# Patient Record
Sex: MALE | Race: WHITE | Employment: OTHER | URBAN - METROPOLITAN AREA
[De-identification: names, ages, dates, MRNs, and addresses within clinical notes are randomized per-mention and may not be internally consistent; named-entity substitution may affect disease eponyms.]

---

## 2018-05-21 ENCOUNTER — OFFICE VISIT (OUTPATIENT)
Dept: CARDIOLOGY CLINIC | Age: 73
End: 2018-05-21
Payer: COMMERCIAL

## 2018-05-21 VITALS
HEIGHT: 71 IN | RESPIRATION RATE: 16 BRPM | BODY MASS INDEX: 31.92 KG/M2 | SYSTOLIC BLOOD PRESSURE: 120 MMHG | WEIGHT: 228 LBS | HEART RATE: 54 BPM | DIASTOLIC BLOOD PRESSURE: 68 MMHG

## 2018-05-21 DIAGNOSIS — I25.10 CORONARY ARTERY DISEASE WITHOUT ANGINA PECTORIS, UNSPECIFIED VESSEL OR LESION TYPE, UNSPECIFIED WHETHER NATIVE OR TRANSPLANTED HEART: Primary | ICD-10-CM

## 2018-05-21 DIAGNOSIS — I10 ESSENTIAL HYPERTENSION: ICD-10-CM

## 2018-05-21 DIAGNOSIS — Z95.1 STATUS POST CORONARY ARTERY BYPASS GRAFT: ICD-10-CM

## 2018-05-21 PROCEDURE — 99213 OFFICE O/P EST LOW 20 MIN: CPT | Performed by: INTERNAL MEDICINE

## 2018-05-21 PROCEDURE — 93000 ELECTROCARDIOGRAM COMPLETE: CPT | Performed by: INTERNAL MEDICINE

## 2018-05-21 RX ORDER — TAMSULOSIN HYDROCHLORIDE 0.4 MG/1
0.8 CAPSULE ORAL NIGHTLY
COMMUNITY

## 2019-03-08 ENCOUNTER — HOSPITAL ENCOUNTER (OUTPATIENT)
Age: 74
Discharge: HOME | End: 2019-03-10
Payer: COMMERCIAL

## 2019-03-08 LAB — PROSTATE SPECIFIC ANTIGEN: 1.12 NG/ML (ref 0–4)

## 2019-03-08 PROCEDURE — 84153 ASSAY OF PSA TOTAL: CPT

## 2019-03-26 ENCOUNTER — TELEPHONE (OUTPATIENT)
Dept: ADMINISTRATIVE | Age: 74
End: 2019-03-26

## 2019-10-02 ENCOUNTER — OFFICE VISIT (OUTPATIENT)
Dept: CARDIOLOGY CLINIC | Age: 74
End: 2019-10-02
Payer: COMMERCIAL

## 2019-10-02 VITALS
HEIGHT: 71 IN | SYSTOLIC BLOOD PRESSURE: 128 MMHG | HEART RATE: 50 BPM | WEIGHT: 231.2 LBS | DIASTOLIC BLOOD PRESSURE: 80 MMHG | BODY MASS INDEX: 32.37 KG/M2 | RESPIRATION RATE: 16 BRPM

## 2019-10-02 DIAGNOSIS — I25.10 CORONARY ARTERY DISEASE INVOLVING NATIVE CORONARY ARTERY OF NATIVE HEART WITHOUT ANGINA PECTORIS: Primary | ICD-10-CM

## 2019-10-02 DIAGNOSIS — Z99.89 OSA ON CPAP: ICD-10-CM

## 2019-10-02 DIAGNOSIS — G47.33 OSA ON CPAP: ICD-10-CM

## 2019-10-02 DIAGNOSIS — Z95.1 S/P CORONARY ARTERY BYPASS GRAFT X 1: ICD-10-CM

## 2019-10-02 DIAGNOSIS — I10 ESSENTIAL HYPERTENSION: ICD-10-CM

## 2019-10-02 DIAGNOSIS — R00.1 SINUS BRADYCARDIA: ICD-10-CM

## 2019-10-02 PROCEDURE — 99214 OFFICE O/P EST MOD 30 MIN: CPT | Performed by: INTERNAL MEDICINE

## 2019-10-02 PROCEDURE — 93000 ELECTROCARDIOGRAM COMPLETE: CPT | Performed by: INTERNAL MEDICINE

## 2019-10-02 RX ORDER — NAPROXEN SODIUM 220 MG
220 TABLET ORAL PRN
COMMUNITY

## 2019-10-02 SDOH — HEALTH STABILITY: MENTAL HEALTH: HOW MANY STANDARD DRINKS CONTAINING ALCOHOL DO YOU HAVE ON A TYPICAL DAY?: 1 OR 2

## 2019-10-02 SDOH — HEALTH STABILITY: MENTAL HEALTH: HOW OFTEN DO YOU HAVE A DRINK CONTAINING ALCOHOL?: MONTHLY OR LESS

## 2019-10-08 ENCOUNTER — NURSE ONLY (OUTPATIENT)
Dept: CARDIOLOGY CLINIC | Age: 74
End: 2019-10-08

## 2019-10-08 VITALS
BODY MASS INDEX: 32.17 KG/M2 | SYSTOLIC BLOOD PRESSURE: 136 MMHG | RESPIRATION RATE: 16 BRPM | HEART RATE: 60 BPM | DIASTOLIC BLOOD PRESSURE: 68 MMHG | HEIGHT: 71 IN | WEIGHT: 229.8 LBS

## 2019-10-18 ENCOUNTER — HOSPITAL ENCOUNTER (OUTPATIENT)
Age: 74
Discharge: HOME | End: 2019-10-20
Payer: COMMERCIAL

## 2019-10-18 PROCEDURE — 87088 URINE BACTERIA CULTURE: CPT

## 2019-11-08 ENCOUNTER — TELEPHONE (OUTPATIENT)
Dept: CARDIOLOGY CLINIC | Age: 74
End: 2019-11-08

## 2020-12-21 ENCOUNTER — OFFICE VISIT (OUTPATIENT)
Dept: CARDIOLOGY CLINIC | Age: 75
End: 2020-12-21
Payer: MEDICARE

## 2020-12-21 VITALS
DIASTOLIC BLOOD PRESSURE: 74 MMHG | HEART RATE: 56 BPM | SYSTOLIC BLOOD PRESSURE: 138 MMHG | RESPIRATION RATE: 16 BRPM | OXYGEN SATURATION: 97 % | HEIGHT: 71 IN | WEIGHT: 238 LBS | BODY MASS INDEX: 33.32 KG/M2

## 2020-12-21 PROCEDURE — 93000 ELECTROCARDIOGRAM COMPLETE: CPT | Performed by: INTERNAL MEDICINE

## 2020-12-21 PROCEDURE — 99214 OFFICE O/P EST MOD 30 MIN: CPT | Performed by: INTERNAL MEDICINE

## 2020-12-21 RX ORDER — OXYBUTYNIN CHLORIDE 10 MG/1
10 TABLET, EXTENDED RELEASE ORAL DAILY
COMMUNITY

## 2020-12-21 RX ORDER — MULTIVIT-MIN/IRON/FOLIC ACID/K 18-600-40
CAPSULE ORAL DAILY
COMMUNITY

## 2020-12-21 RX ORDER — TRAZODONE HYDROCHLORIDE 50 MG/1
25 TABLET ORAL NIGHTLY
COMMUNITY

## 2020-12-21 RX ORDER — MEMANTINE HYDROCHLORIDE 5 MG/1
5 TABLET ORAL DAILY
COMMUNITY

## 2020-12-21 NOTE — PROGRESS NOTES
OFFICE VISIT     PRIMARY CARE PHYSICIAN:      Joe Montoya DO       ALLERGIES / SENSITIVITIES:        Allergies   Allergen Reactions    Anafranil [Clomipramine Hcl]      Panic reaction    Atarax [Hydroxyzine] Other (See Comments)     Panic attack    Serzone [Nefazodone Hydrochloride] Other (See Comments)     Panic attack    Vistaril [Hydroxyzine Hcl] Other (See Comments)     Panic attack    Zoloft      ? REVIEWED MEDICATIONS:        Current Outpatient Medications:     oxybutynin (DITROPAN-XL) 10 MG extended release tablet, Take 10 mg by mouth daily, Disp: , Rfl:     traZODone (DESYREL) 50 MG tablet, Take 25 mg by mouth nightly, Disp: , Rfl:     memantine (NAMENDA) 5 MG tablet, Take 5 mg by mouth daily, Disp: , Rfl:     Ascorbic Acid (VITAMIN C) 500 MG CAPS, Take by mouth daily, Disp: , Rfl:     metoprolol tartrate (LOPRESSOR) 25 MG tablet, Take 0.5 tablets by mouth 2 times daily, Disp: 90 tablet, Rfl: 3    Calcium Carbonate-Vit D-Min (CALTRATE 600+D PLUS MINERALS PO), Take 1 tablet by mouth daily, Disp: , Rfl:     naproxen sodium (ALEVE) 220 MG tablet, Take 220 mg by mouth as needed for Pain, Disp: , Rfl:     tamsulosin (FLOMAX) 0.4 MG capsule, Take 0.8 mg by mouth nightly , Disp: , Rfl:     Omega-3 Fatty Acids (FISH OIL PO), Take 1 capsule by mouth daily , Disp: , Rfl:     donepezil (ARICEPT) 10 MG tablet, Take 10 mg by mouth nightly , Disp: , Rfl:     GLUCOSAMINE HCL PO, Take 2,000 mg by mouth 2 times daily, Disp: , Rfl:     NONFORMULARY, Digest-Zyme 1 daily, Disp: , Rfl:     atorvastatin (LIPITOR) 10 MG tablet, Take 10 mg by mouth daily , Disp: , Rfl:     Multiple Vitamins-Minerals (CENTRUM ADULTS PO), Take 1 tablet by mouth daily , Disp: , Rfl:     hydrochlorothiazide (HYDRODIURIL) 25 MG tablet, Take 25 mg by mouth daily. , Disp: , Rfl:     losartan (COZAAR) 100 MG tablet, Take 100 mg by mouth daily. , Disp: , Rfl:  Hypogonadism male     Psychiatric disorder     Renal disease     S/P sclerotherapy of varicose veins     both legs    Sleep apnea     in past    Thyroid disease     hypothyroidism            Past Surgical History:   Procedure Laterality Date    APPENDECTOMY      CARDIAC CATHETERIZATION  7/22/2013    [facility]    CARDIAC SURGERY      stents    CATARACT REMOVAL WITH IMPLANT Bilateral     left 12/16 & right 1/17    CHOLECYSTECTOMY      CIRCUMCISION      CORONARY ANGIOPLASTY  01/24/97    Stent LAD    CORONARY ANGIOPLASTY  03/14/11    instent restenosis of previously placed stents    CORONARY ARTERY BYPASS GRAFT  8/6/13    CYSTOURETHROSCOPY/URETHRAL DILATION      DIAGNOSTIC CARDIAC [facility]EDURE  [facility]    DIAGNOSTIC CARDIAC CATH LAB PROCEDURE  01/23/97    [facility]    DIAGNOSTIC CARDIAC CATH LAB PROCEDURE  11/25/97    [facility]    DIAGNOSTIC CARDIAC CATH LAB PROCEDURE  02/18/10    [facility], stent/proximal LAD    DIAGNOSTIC CARDIAC CATH LAB PROCEDURE  03/14/11    [facility] mid LAD           Family History   Problem Relation Age of Onset    Diabetes Sister     Hypertension Sister     Elevated Lipids Sister     Coronary Art Dis Sister         s/p CABG          Social History     Tobacco Use    Smoking status: Never Smoker    Smokeless tobacco: Never Used   Substance Use Topics    Alcohol use:  Yes     Alcohol/week: 0.0 standard drinks     Frequency: Monthly or less     Drinks per session: 1 or 2     Binge frequency: Never     Comment: occasional glass of wine         Review of Systems:  Constitutional: negative for fever and chills, or significant weight loss  HEENT: negative for acute visual symptoms or auditory problems, no dysphagia  Respiratory: negative for cough, wheezing, or hemoptysis  Cardiovascular: negative for chest pain, palpitations, and dyspnea  Gastrointestinal: negative for abdominal pain, diarrhea, nausea and vomiting  Endocrine: Negative for polyuria and polydyspsia S/P coronary artery bypass graft - off-pump CABG  x1, left internal mammary artery to the LAD on 8/6/2013. Dr Carolin Lennox     Non morbid obesity - Diet, exercise and weight loss discussed. JEFRY on CPAP - Don't use CPAP any more     Edema feet - Elevate feet, decrease salt, He was referred to Vascular surgery - Dr Mark Shanks, [place]    Preventive Cardiology: Low cholesterol diet, regular exercise as tolerate, and gradual weight loss discussed. Monitor BP and heart rates. Above recommendations discussed with him   All questions answered about cardiac diagnoses and cardiac medications. Continue current medications. Compliance with medications and f/u with all physicians discussed. Risk factor modification based on risk profile discussed. Call if any exertional chest pain, short of breath, dizzy or palpitations   Follow up in 6 months or earlier if needed.          [facility]  [address] [facility], [address] [place] [address]  [phone]

## 2021-02-26 ENCOUNTER — IMMUNIZATION (OUTPATIENT)
Dept: PRIMARY CARE CLINIC | Age: 76
End: 2021-02-26
Payer: MEDICARE

## 2021-02-26 PROCEDURE — 91300 COVID-19, PFIZER VACCINE 30MCG/0.3ML DOSE: CPT | Performed by: PHYSICIAN ASSISTANT

## 2021-02-26 PROCEDURE — 0001A COVID-19, PFIZER VACCINE 30MCG/0.3ML DOSE: CPT | Performed by: PHYSICIAN ASSISTANT

## 2021-03-22 ENCOUNTER — IMMUNIZATION (OUTPATIENT)
Dept: PRIMARY CARE CLINIC | Age: 76
End: 2021-03-22
Payer: MEDICARE

## 2021-03-22 PROCEDURE — 0002A COVID-19, PFIZER VACCINE 30MCG/0.3ML DOSE: CPT | Performed by: NURSE PRACTITIONER

## 2021-03-22 PROCEDURE — 91300 COVID-19, PFIZER VACCINE 30MCG/0.3ML DOSE: CPT | Performed by: NURSE PRACTITIONER

## 2022-06-25 ENCOUNTER — HOSPITAL ENCOUNTER (EMERGENCY)
Age: 77
Discharge: HOME | End: 2022-06-25
Attending: EMERGENCY MEDICINE
Payer: MEDICARE

## 2022-06-25 ENCOUNTER — APPOINTMENT (OUTPATIENT)
Dept: CT IMAGING | Age: 77
End: 2022-06-25
Payer: MEDICARE

## 2022-06-25 VITALS
BODY MASS INDEX: 30.1 KG/M2 | WEIGHT: 215 LBS | TEMPERATURE: 98 F | HEART RATE: 60 BPM | HEIGHT: 71 IN | RESPIRATION RATE: 18 BRPM | SYSTOLIC BLOOD PRESSURE: 180 MMHG | DIASTOLIC BLOOD PRESSURE: 76 MMHG | OXYGEN SATURATION: 98 %

## 2022-06-25 DIAGNOSIS — I10 ESSENTIAL HYPERTENSION: Primary | ICD-10-CM

## 2022-06-25 LAB
ANION GAP SERPL CALCULATED.3IONS-SCNC: 8 MMOL/L (ref 7–16)
BUN BLDV-MCNC: 23 MG/DL (ref 6–23)
CALCIUM SERPL-MCNC: 9.4 MG/DL (ref 8.6–10.2)
CHLORIDE BLD-SCNC: 105 MMOL/L (ref 98–107)
CO2: 29 MMOL/L (ref 22–29)
CREAT SERPL-MCNC: 1 MG/DL (ref 0.7–1.2)
GFR AFRICAN AMERICAN: >60
GFR NON-AFRICAN AMERICAN: >60 ML/MIN/1.73
GLUCOSE BLD-MCNC: 113 MG/DL (ref 74–99)
HCT VFR BLD CALC: 41.7 % (ref 37–54)
HEMOGLOBIN: 14.1 G/DL (ref 12.5–16.5)
MCH RBC QN AUTO: 31.1 PG (ref 26–35)
MCHC RBC AUTO-ENTMCNC: 33.8 % (ref 32–34.5)
MCV RBC AUTO: 91.9 FL (ref 80–99.9)
PDW BLD-RTO: 12.6 FL (ref 11.5–15)
PLATELET # BLD: 224 E9/L (ref 130–450)
PMV BLD AUTO: 10.5 FL (ref 7–12)
POTASSIUM SERPL-SCNC: 4.2 MMOL/L (ref 3.5–5)
RBC # BLD: 4.54 E12/L (ref 3.8–5.8)
SODIUM BLD-SCNC: 142 MMOL/L (ref 132–146)
TROPONIN, HIGH SENSITIVITY: 16 NG/L (ref 0–11)
TROPONIN, HIGH SENSITIVITY: 19 NG/L (ref 0–11)
WBC # BLD: 10.3 E9/L (ref 4.5–11.5)

## 2022-06-25 PROCEDURE — 36415 COLL VENOUS BLD VENIPUNCTURE: CPT

## 2022-06-25 PROCEDURE — 93005 ELECTROCARDIOGRAM TRACING: CPT | Performed by: PHYSICIAN ASSISTANT

## 2022-06-25 PROCEDURE — 96372 THER/PROPH/DIAG INJ SC/IM: CPT

## 2022-06-25 PROCEDURE — 6360000002 HC RX W HCPCS: Performed by: STUDENT IN AN ORGANIZED HEALTH CARE EDUCATION/TRAINING PROGRAM

## 2022-06-25 PROCEDURE — 85027 COMPLETE CBC AUTOMATED: CPT

## 2022-06-25 PROCEDURE — 96374 THER/PROPH/DIAG INJ IV PUSH: CPT

## 2022-06-25 PROCEDURE — 99284 EMERGENCY DEPT VISIT MOD MDM: CPT

## 2022-06-25 PROCEDURE — 80048 BASIC METABOLIC PNL TOTAL CA: CPT

## 2022-06-25 PROCEDURE — 84484 ASSAY OF TROPONIN QUANT: CPT

## 2022-06-25 PROCEDURE — 70450 CT HEAD/BRAIN W/O DYE: CPT

## 2022-06-25 RX ORDER — HYDRALAZINE HYDROCHLORIDE 20 MG/ML
10 INJECTION INTRAMUSCULAR; INTRAVENOUS ONCE
Status: COMPLETED | OUTPATIENT
Start: 2022-06-25 | End: 2022-06-25

## 2022-06-25 RX ORDER — HYDRALAZINE HYDROCHLORIDE 20 MG/ML
10 INJECTION INTRAMUSCULAR; INTRAVENOUS ONCE
Status: DISCONTINUED | OUTPATIENT
Start: 2022-06-25 | End: 2022-06-25

## 2022-06-25 RX ORDER — SODIUM CHLORIDE 0.9 % (FLUSH) 0.9 %
10 SYRINGE (ML) INJECTION PRN
Status: DISCONTINUED | OUTPATIENT
Start: 2022-06-25 | End: 2022-06-26 | Stop reason: HOSPADM

## 2022-06-25 RX ORDER — HYDRALAZINE HYDROCHLORIDE 20 MG/ML
5 INJECTION INTRAMUSCULAR; INTRAVENOUS ONCE
Status: COMPLETED | OUTPATIENT
Start: 2022-06-25 | End: 2022-06-25

## 2022-06-25 RX ADMIN — HYDRALAZINE HYDROCHLORIDE 10 MG: 20 INJECTION INTRAMUSCULAR; INTRAVENOUS at 21:25

## 2022-06-25 RX ADMIN — HYDRALAZINE HYDROCHLORIDE 5 MG: 20 INJECTION INTRAMUSCULAR; INTRAVENOUS at 20:05

## 2022-06-25 ASSESSMENT — PAIN SCALES - GENERAL: PAINLEVEL_OUTOF10: 5

## 2022-06-25 ASSESSMENT — PAIN DESCRIPTION - DESCRIPTORS: DESCRIPTORS: ACHING

## 2022-06-25 ASSESSMENT — PAIN DESCRIPTION - LOCATION: LOCATION: HEAD

## 2022-06-25 ASSESSMENT — PAIN - FUNCTIONAL ASSESSMENT: PAIN_FUNCTIONAL_ASSESSMENT: 0-10

## 2022-06-25 NOTE — ED PROVIDER NOTES
[address]      Pt Name: Andrew Ley  MRN: [id number]  [place] 1945  Date of evaluation: 6/25/2022      CHIEF COMPLAINT       Chief Complaint   Patient presents with    Hypertension     High blood pressures today at home. Compliant with BP meds. +intermittent dizziness. +headache. Hx of fall with head injury one month ago. HPI  Andrew Ley is a 65 y.o. male history of hypertension and presents with complaints of elevated blood pressure. Patient states that he also has intermittent headaches at times. Thinks is related to his blood pressure. No alleviating or exacerbating factors. He states that he takes medications as directed. Describes headache as dull, aching, located at the front of his scalp. No alleviating exacerbating factors. Has taken Tylenol with mild relief of his symptoms. Patient does add that when he wakes up in the morning he always feels lightheaded and has near syncopal episodes when he stands up. States that he has not fallen from episodes like this and he usually sits down or lies flat and the symptoms lc. Denies any photophobia, focal logic deficits, changes in vision, ataxia, fevers, chills, nausea, vomiting, chest pain, shortness of breath, abdominal pain, flank pain, dysuria, hematuria, diarrhea, constipation, new rashes or sores. Except as noted above the remainder of the review of systems was reviewed and negative. Review of Systems   Constitutional: Negative for activity change, appetite change, diaphoresis, fatigue, fever and unexpected weight change. HENT: Negative for congestion. Eyes: Negative for visual disturbance. Respiratory: Negative for cough, shortness of breath, wheezing and stridor. Cardiovascular: Negative for chest pain, palpitations and leg swelling.    Gastrointestinal: Negative for abdominal distention, abdominal pain, constipation, diarrhea, nausea and vomiting. Endocrine: Negative for polyphagia and polyuria. Genitourinary: Negative for dysuria and hematuria. Musculoskeletal: Negative for back pain. Skin: Negative for color change, pallor, rash and wound. Neurological: Positive for headaches. Negative for dizziness, seizures, syncope, speech difficulty and weakness. Hematological: Negative for adenopathy. Does not bruise/bleed easily. Psychiatric/Behavioral: Negative for agitation. Physical Exam  Vitals and nursing note reviewed. Constitutional:       General: He is not in acute distress. Appearance: Normal appearance. He is not ill-appearing or diaphoretic. HENT:      Head: Normocephalic and atraumatic. Right Ear: External ear normal.      Left Ear: External ear normal.      Nose: Nose normal.      Mouth/Throat:      Mouth: Mucous membranes are moist.      Pharynx: Oropharynx is clear. Eyes:      Extraocular Movements: Extraocular movements intact. Pupils: Pupils are equal, round, and reactive to light. Cardiovascular:      Rate and Rhythm: Normal rate and regular rhythm. Pulses: Normal pulses. Heart sounds: Normal heart sounds. Pulmonary:      Effort: Pulmonary effort is normal.      Breath sounds: Normal breath sounds. Abdominal:      General: Abdomen is flat. Palpations: Abdomen is soft. Tenderness: There is no abdominal tenderness. There is no right CVA tenderness, left CVA tenderness or rebound. Negative signs include Dias's sign, Rovsing's sign and McBurney's sign. Musculoskeletal:         General: Normal range of motion. Cervical back: Normal range of motion. Right lower leg: No edema. Left lower leg: No edema. Skin:     General: Skin is warm and dry. Capillary Refill: Capillary refill takes less than 2 seconds. Neurological:      General: No focal deficit present.       Mental Status: He is alert and oriented to person, place, and time. Psychiatric:         Mood and Affect: Mood normal.          Procedures     MDM  Number of Diagnoses or Management Options  Essential hypertension  Diagnosis management comments: 25-year-old male history of hypertension presents with complaints of elevated blood pressure and intermittent headaches. Vitals here significant for hypertension. On physical exam patient is no acute distress. Cranial nerves II through XII grossly intact. Symmetric discharge and discharge kinesia. Lungs are clear to auscultation bilaterally no wheeze rales rhonchi. Normal S1 and S2. Abdomen soft and nontender with no rebound or guarding. Diagnostic labs and imaging interpreted reviewed. CT head negative for any acute process. No signs of endorgan damage. EKG shows no signs of ischemia. Patient was given hydralazine here with relief of his blood pressure. He states that he did not have a headache at this time. Patient discharged home with close follow-up with his primary care physician for blood pressure med. I did not want to give the patient any more blood pressure medication as an outpatient as he has had episodes of near syncopal episodes from history. Patient is agreeable with plan. Patient is awake alert, hemodynamic stable, afebrile and in no respiratory distress. Discussed with patient plan for close outpatient follow-up with the patient's PCP as well as return precautions and the patient understands and agrees to the plan.     Patient was seen with attending.                   --------------------------------------------- PAST HISTORY ---------------------------------------------  Past Medical History:  has a past medical history of Anxiety and depression, CAD (coronary artery disease), Depression, DM (diabetes mellitus) ([facility] [address] Erectile dysfunction, Essential hypertension, Gastric ulcer, H/O scarlet fever, Heart murmur, Hyperlipidemia, Hypertension, Hypogonadism male, Psychiatric disorder, Renal disease, S/P sclerotherapy of varicose veins, Sleep apnea, and Thyroid disease. Past Surgical History:  has a past surgical history that includes Cholecystectomy; Appendectomy; Cardiac surgery; Diagnostic Cardiac Cath Lab Procedure (1987); Diagnostic Cardiac Cath Lab Procedure (01/23/97); Diagnostic Cardiac Cath Lab Procedure (11/25/97); Diagnostic Cardiac Cath Lab Procedure (02/18/10); Diagnostic Cardiac Cath Lab Procedure (03/14/11); Coronary angioplasty (01/24/97); Coronary angioplasty (03/14/11); Cystourethroscopy/Urethral Dilation; Circumcision; Cardiac catheterization (7/22/2013); Coronary artery bypass graft (8/6/13); and Cataract removal with implant (Bilateral). Social History:  reports that he has never smoked. He has never used smokeless tobacco. He reports current alcohol use. He reports that he does not use drugs. Family History: family history includes Coronary Art Dis in his sister; Diabetes in his sister; Elevated Lipids in his sister; Hypertension in his sister. The patients home medications have been reviewed.     Allergies: Anafranil [clomipramine hcl], Atarax [hydroxyzine], Serzone [nefazodone hydrochloride], Vistaril [hydroxyzine hcl], and Zoloft    -------------------------------------------------- RESULTS -------------------------------------------------  Labs:  Results for orders placed or performed during the hospital encounter of 27/37/88   Basic metabolic panel   Result Value Ref Range    Sodium 142 132 - 146 mmol/L    Potassium 4.2 3.5 - 5.0 mmol/L    Chloride 105 98 - 107 mmol/L    CO2 29 22 - 29 mmol/L    Anion Gap 8 7 - 16 mmol/L    Glucose 113 (H) 74 - 99 mg/dL    BUN 23 6 - 23 mg/dL    CREATININE 1.0 0.7 - 1.2 mg/dL    GFR Non-African American >60 >=60 mL/min/1.73    GFR African American >60     Calcium 9.4 8.6 - 10.2 mg/dL   CBC   Result Value Ref Range    WBC 10.3 4.5 - 11.5 E9/L    RBC 4.54 3.80 - 5.80 E12/L    Hemoglobin 14.1 12.5 - 16.5 g/dL    Hematocrit 41.7 37.0 - 54.0 %    MCV 91.9 80.0 - 99.9 fL    MCH 31.1 26.0 - 35.0 pg    MCHC 33.8 32.0 - 34.5 %    RDW 12.6 11.5 - 15.0 fL    Platelets 123 708 - 916 E9/L    MPV 10.5 7.0 - 12.0 fL   Troponin   Result Value Ref Range    Troponin, High Sensitivity 16 (H) 0 - 11 ng/L   Troponin   Result Value Ref Range    Troponin, High Sensitivity 19 (H) 0 - 11 ng/L   EKG 12 Lead   Result Value Ref Range    Ventricular Rate 52 BPM    Atrial Rate 52 BPM    P-R Interval 220 ms    QRS Duration 96 ms    Q-T Interval 482 ms    QTc Calculation (Bazett) 448 ms    P Axis 41 degrees    R Axis 62 degrees    T Axis -148 degrees       ECG:  Please refer to workup tab for EKG read    Radiology:  CT HEAD WO CONTRAST   Final Result   1. No intracranial hemorrhage or acute intracranial disease. 2.  Mild cerebellar atrophy. 3.  Mild paranasal sinusitis. EKG: This EKG is signed and interpreted by me. Sinus bradycardia first-degree AV block, no ST segment elevation or depression, UT interval 220 MS, QRS 96 MS,  MS  Comparison: no previous EKG      ------------------------- NURSING NOTES AND VITALS REVIEWED ---------------------------  Date / Time Roomed:  6/25/2022  6:40 PM  ED Bed Assignment:  33/33    The nursing notes within the ED encounter and vital signs as below have been reviewed. BP (!) 180/76   Pulse 60   Temp 98 °F (36.7 °C) (Oral)   Resp 18   Ht 5' 11\" (1.803 m)   Wt 215 lb (97.5 kg)   SpO2 98%   BMI 29.99 kg/m²   Oxygen Saturation Interpretation: normal      ------------------------------------------ PROGRESS NOTES ------------------------------------------    I have spoken with the patient and discussed todays results, in addition to providing specific details for the plan of care and counseling regarding the diagnosis and prognosis. Their questions are answered at this time and they are agreeable with the plan. I discussed at length with them reasons for immediate return here for re evaluation.  They will followup with their PCP by calling their office tomorrow. --------------------------------- ADDITIONAL PROVIDER NOTES ---------------------------------  At this time the patient is without objective evidence of an acute process requiring hospitalization or inpatient management. They have remained hemodynamically stable throughout their entire ED visit and are stable for discharge with outpatient follow-up. The plan has been discussed in detail and they are aware of the specific conditions for emergent return, as well as the importance of follow-up. Discharge Medication List as of 2022  8:58 PM          Diagnosis:  1. Essential hypertension        Disposition:  Patient's disposition: Discharge to home  Patient's condition is stable. Guanaco Morrison MD  Resident  22 [phone]     ATTENDING PROVIDER ATTESTATION:     I,  Dr. Tony Luu am the primary physician of record for this patient. Naheed Cordero presented to the emergency department for evaluation of Hypertension (High blood pressures today at home. Compliant with BP meds. +intermittent dizziness. +headache. Hx of fall with head injury one month ago. )   and was initially evaluated by the Medical Resident. See Original ED Note for H&P and ED course above. I have reviewed and discussed the case, including pertinent history (medical, surgical, family and social) and exam findings with the Medical Resident assigned to Naheed Cordero. I have personally performed and/or participated in the history, exam, medical decision making, and procedures and agree with all pertinent clinical information. I have reviewed my findings and recommendations with the assigned Medical Resident, Naheed Cordero and members of family present at the time of disposition. My findings/plan: The encounter diagnosis was Essential hypertension.   Discharge Medication List as of 2022  8:58 PM        DO Jessica Azul [address] [facility],   06/26/22 0252

## 2022-06-26 ASSESSMENT — ENCOUNTER SYMPTOMS
ABDOMINAL PAIN: 0
COLOR CHANGE: 0
ABDOMINAL DISTENTION: 0
COUGH: 0
DIARRHEA: 0
CONSTIPATION: 0
SHORTNESS OF BREATH: 0
STRIDOR: 0
BACK PAIN: 0
WHEEZING: 0
VOMITING: 0
NAUSEA: 0

## 2022-06-27 LAB
EKG ATRIAL RATE: 52 BPM
EKG P AXIS: 41 DEGREES
EKG P-R INTERVAL: 220 MS
EKG Q-T INTERVAL: 482 MS
EKG QRS DURATION: 96 MS
EKG QTC CALCULATION (BAZETT): 448 MS
EKG R AXIS: 62 DEGREES
EKG T AXIS: -148 DEGREES
EKG VENTRICULAR RATE: 52 BPM

## 2022-07-13 ENCOUNTER — OFFICE VISIT (OUTPATIENT)
Dept: CARDIOLOGY CLINIC | Age: 77
End: 2022-07-13
Payer: MEDICARE

## 2022-07-13 VITALS
SYSTOLIC BLOOD PRESSURE: 140 MMHG | HEIGHT: 71 IN | OXYGEN SATURATION: 97 % | RESPIRATION RATE: 16 BRPM | BODY MASS INDEX: 31.27 KG/M2 | WEIGHT: 223.4 LBS | DIASTOLIC BLOOD PRESSURE: 80 MMHG | HEART RATE: 56 BPM

## 2022-07-13 DIAGNOSIS — I25.10 CORONARY ARTERY DISEASE INVOLVING NATIVE CORONARY ARTERY OF NATIVE HEART WITHOUT ANGINA PECTORIS: ICD-10-CM

## 2022-07-13 DIAGNOSIS — Z95.1 STATUS POST CORONARY ARTERY BYPASS GRAFT: ICD-10-CM

## 2022-07-13 DIAGNOSIS — I10 ESSENTIAL HYPERTENSION: Primary | ICD-10-CM

## 2022-07-13 DIAGNOSIS — R00.1 SINUS BRADYCARDIA: ICD-10-CM

## 2022-07-13 DIAGNOSIS — E66.9 NON MORBID OBESITY: ICD-10-CM

## 2022-07-13 DIAGNOSIS — Z99.89 OSA ON CPAP: ICD-10-CM

## 2022-07-13 DIAGNOSIS — G47.33 OSA ON CPAP: ICD-10-CM

## 2022-07-13 PROCEDURE — 1123F ACP DISCUSS/DSCN MKR DOCD: CPT | Performed by: INTERNAL MEDICINE

## 2022-07-13 PROCEDURE — 99214 OFFICE O/P EST MOD 30 MIN: CPT | Performed by: INTERNAL MEDICINE

## 2022-07-13 PROCEDURE — 93000 ELECTROCARDIOGRAM COMPLETE: CPT | Performed by: INTERNAL MEDICINE

## 2022-07-13 RX ORDER — AMLODIPINE BESYLATE 5 MG/1
5 TABLET ORAL DAILY
Qty: 30 TABLET | Refills: 3
Start: 2022-07-13 | End: 2022-07-18 | Stop reason: SDUPTHER

## 2022-07-13 RX ORDER — CHOLECALCIFEROL (VITAMIN D3) 125 MCG
5 CAPSULE ORAL NIGHTLY PRN
COMMUNITY

## 2022-07-13 RX ORDER — OMEPRAZOLE 20 MG/1
20 CAPSULE, DELAYED RELEASE ORAL DAILY
COMMUNITY

## 2022-07-13 NOTE — PATIENT INSTRUCTIONS
Monitor BP  Start new medicine - Amlodipine, call if edema feet  Stop Metoprolol due to slow heart rates

## 2022-07-13 NOTE — PROGRESS NOTES
OFFICE VISIT     PRIMARY CARE PHYSICIAN:      Gus Hassan, DO       ALLERGIES / SENSITIVITIES:        Allergies   Allergen Reactions    Anafranil [Clomipramine Hcl]      Panic reaction    Atarax [Hydroxyzine] Other (See Comments)     Panic attack    Serzone [Nefazodone Hydrochloride] Other (See Comments)     Panic attack    Vistaril [Hydroxyzine Hcl] Other (See Comments)     Panic attack    Zoloft      ? REVIEWED MEDICATIONS:        Current Outpatient Medications:     melatonin 5 MG TABS tablet, Take 5 mg by mouth nightly as needed, Disp: , Rfl:     omeprazole (PRILOSEC) 20 MG delayed release capsule, Take 20 mg by mouth daily, Disp: , Rfl:     amLODIPine (NORVASC) 5 MG tablet, Take 1 tablet by mouth daily, Disp: 30 tablet, Rfl: 3    oxybutynin (DITROPAN-XL) 10 MG extended release tablet, Take 10 mg by mouth daily, Disp: , Rfl:     Ascorbic Acid (VITAMIN C) 500 MG CAPS, Take by mouth daily, Disp: , Rfl:     Calcium Carbonate-Vit D-Min (CALTRATE 600+D PLUS MINERALS PO), Take 1 tablet by mouth daily, Disp: , Rfl:     naproxen sodium (ALEVE) 220 MG tablet, Take 220 mg by mouth as needed for Pain, Disp: , Rfl:     tamsulosin (FLOMAX) 0.4 MG capsule, Take 0.8 mg by mouth nightly , Disp: , Rfl:     Omega-3 Fatty Acids (FISH OIL PO), Take 1 capsule by mouth daily , Disp: , Rfl:     donepezil (ARICEPT) 10 MG tablet, Take 10 mg by mouth nightly , Disp: , Rfl:     GLUCOSAMINE HCL PO, Take 2,000 mg by mouth 2 times daily, Disp: , Rfl:     atorvastatin (LIPITOR) 10 MG tablet, Take 10 mg by mouth daily , Disp: , Rfl:     Multiple Vitamins-Minerals (CENTRUM ADULTS PO), Take 1 tablet by mouth daily , Disp: , Rfl:     hydrochlorothiazide (HYDRODIURIL) 25 MG tablet, Take 25 mg by mouth daily. , Disp: , Rfl:     losartan (COZAAR) 100 MG tablet, Take 100 mg by mouth daily. , Disp: , Rfl:     Docusate Sodium (DSS) 100 MG CAPS, Take 100 mg by mouth 2 times daily as needed. , Disp: 20 capsule, Rfl: 0    potassium chloride (MICRO-K) 10 MEQ CR capsule, Take 10 mEq by mouth 3 times daily , Disp: , Rfl:     aspirin EC 81 MG EC tablet, Take 81 mg by mouth daily. , Disp: , Rfl:     buPROPion (WELLBUTRIN XL) 150 MG XL tablet, Take 150 mg by mouth every morning.  , Disp: , Rfl:     citalopram (CELEXA) 40 MG tablet, Take 40 mg by mouth nightly., Disp: , Rfl:     Levothyroxine Sodium (SYNTHROID PO), Take 88 mcg by mouth daily , Disp: , Rfl:     ALPRAZolam (XANAX) 0.5 MG tablet, Take 0.25 mg by mouth daily. , Disp: , Rfl:     traZODone (DESYREL) 50 MG tablet, Take 25 mg by mouth nightly (Patient not taking: Reported on 7/13/2022), Disp: , Rfl:     memantine (NAMENDA) 5 MG tablet, Take 5 mg by mouth daily (Patient not taking: Reported on 7/13/2022), Disp: , Rfl:     NONFORMULARY, Digest-Zyme 1 daily (Patient not taking: Reported on 7/13/2022), Disp: , Rfl:       S: REASON FOR VISIT:       Chief Complaint   Patient presents with    Coronary Artery Disease     Overdue Annual- Patient complains of high blood pressure, dizziness, and lightheadeness,    Hypertension          History of Present Illness:       Office Visit for follow up of CAD, CABG, bradycardia   65 yr old Mark Trivedi with history of CAD, s/p CABG in 2013, sinus bradycardia came for f/u visit   C/o high BP and occ Dizzy - no syncope   Fell and seen in ER, but no syncope   Home BP readings were high   Told will be referred to Nephrology, for HTN   No hospitalizations or surgeries since last visit   Patient is compliant with all medications   Luis any exertional chest pain or short of breath   No palpitations, dizzy or syncope.    Active at home   Try to watch diet          Past Medical History:   Diagnosis Date    Anxiety and depression     CAD (coronary artery disease)     PTCA and stents    Depression     DM (diabetes mellitus) ([facility] [address]     Erectile dysfunction     Essential hypertension 5/23/2016    Gastric ulcer     multiple    H/O scarlet fever     Heart murmur     in childhood, diet, exercise, weight loss discussed     S/P coronary artery bypass graft - off-pump CABG  x1, left internal mammary artery to the LAD on 8/6/2013. Dr Idalia Klinefelter     Non morbid obesity - Diet, exercise and weight loss discussed. Headaches - Advised to see Neurology    Hx of Edema feet - Elevate feet, decrease salt, He was referred to Vascular surgery - Dr Cameron Baez, [place]     Preventive Cardiology: Low cholesterol diet, regular exercise as tolerate, and gradual weight loss discussed. Other orders  -     amLODIPine (NORVASC) 5 MG tablet; Take 1 tablet by mouth daily     Above recommendations discussed him and his wife. The patient's current medication list, allergies, problem list and results of prior tests (as available) were reviewed at today's visit   All questions answered about cardiac diagnoses and cardiac medications. Continue current medications. Monitor BP and heart rates. Compliance with medications and f/u with all physicians discussed. Risk factor modification based on risk profile discussed. Call if any exertional chest pain, short of breath, dizzy or palpitations. Follow up in 9 months or earlier if needed.          [facility]  [address], [place], [address] [place] [address]  [phone]

## 2022-07-18 RX ORDER — AMLODIPINE BESYLATE 5 MG/1
5 TABLET ORAL DAILY
Qty: 90 TABLET | Refills: 3 | Status: SHIPPED
Start: 2022-07-18 | End: 2022-08-03 | Stop reason: SDUPTHER

## 2022-07-27 ENCOUNTER — TELEPHONE (OUTPATIENT)
Dept: CARDIOLOGY CLINIC | Age: 77
End: 2022-07-27

## 2022-07-27 NOTE — TELEPHONE ENCOUNTER
Patient called. His BP has been running in the 200's. His PCP increased the Norvasc to 2 daily but it does not seem to be helping. BP was 213/122 with a heart rate of 67. Advised by PCP to go to ER at [facility] (is supposed to have renal artery us tomorrow). Advised to go to ER and I would let you know what was happening with him.

## 2022-08-03 RX ORDER — AMLODIPINE BESYLATE 10 MG/1
10 TABLET ORAL DAILY
Qty: 90 TABLET | Refills: 3 | Status: SHIPPED | OUTPATIENT
Start: 2022-08-03

## 2022-08-09 ENCOUNTER — TELEPHONE (OUTPATIENT)
Dept: CARDIOLOGY CLINIC | Age: 77
End: 2022-08-09

## 2023-02-09 ENCOUNTER — OFFICE VISIT (OUTPATIENT)
Dept: CARDIOLOGY CLINIC | Age: 78
End: 2023-02-09

## 2023-02-09 VITALS
WEIGHT: 229.2 LBS | SYSTOLIC BLOOD PRESSURE: 120 MMHG | RESPIRATION RATE: 18 BRPM | BODY MASS INDEX: 32.09 KG/M2 | DIASTOLIC BLOOD PRESSURE: 62 MMHG | HEART RATE: 55 BPM | HEIGHT: 71 IN

## 2023-02-09 DIAGNOSIS — I25.10 CORONARY ARTERY DISEASE INVOLVING NATIVE CORONARY ARTERY OF NATIVE HEART WITHOUT ANGINA PECTORIS: ICD-10-CM

## 2023-02-09 DIAGNOSIS — Z95.1 STATUS POST CORONARY ARTERY BYPASS GRAFT: ICD-10-CM

## 2023-02-09 DIAGNOSIS — R00.1 SINUS BRADYCARDIA: ICD-10-CM

## 2023-02-09 DIAGNOSIS — Z99.89 OSA ON CPAP: ICD-10-CM

## 2023-02-09 DIAGNOSIS — R42 DIZZY: ICD-10-CM

## 2023-02-09 DIAGNOSIS — I10 ESSENTIAL HYPERTENSION: Primary | ICD-10-CM

## 2023-02-09 DIAGNOSIS — G47.33 OSA ON CPAP: ICD-10-CM

## 2023-02-09 RX ORDER — ALFUZOSIN HYDROCHLORIDE 10 MG/1
TABLET, EXTENDED RELEASE ORAL
COMMUNITY
Start: 2023-01-25

## 2023-02-09 NOTE — PROGRESS NOTES
Patient was seen today and a 7 DAY XT monitor was placed. Monitor was ordered by Dr. Stew Pate. The monitor was applied, instructions were given to the patient. Patient stated understanding and gave verbalize readback.    Monitor company: [employer]  Serial number: [id number]

## 2023-02-09 NOTE — PROGRESS NOTES
OFFICE VISIT     PRIMARY CARE PHYSICIAN:      Donya Mederos,        ALLERGIES / SENSITIVITIES:        Allergies   Allergen Reactions    Anafranil [Clomipramine Hcl]      Panic reaction    Atarax [Hydroxyzine] Other (See Comments)     Panic attack    Serzone [Nefazodone Hydrochloride] Other (See Comments)     Panic attack    Vistaril [Hydroxyzine Hcl] Other (See Comments)     Panic attack    Zoloft      ? REVIEWED MEDICATIONS:        Current Outpatient Medications:     alfuzosin (UROXATRAL) 10 MG extended release tablet, TAKE ONE TABLET BY MOUTH EVERY DAY, Disp: , Rfl:     amLODIPine (NORVASC) 10 MG tablet, Take 1 tablet by mouth in the morning., Disp: 90 tablet, Rfl: 3    melatonin 5 MG TABS tablet, Take 5 mg by mouth nightly as needed, Disp: , Rfl:     omeprazole (PRILOSEC) 20 MG delayed release capsule, Take 20 mg by mouth daily, Disp: , Rfl:     oxybutynin (DITROPAN-XL) 10 MG extended release tablet, Take 10 mg by mouth daily, Disp: , Rfl:     Ascorbic Acid (VITAMIN C) 500 MG CAPS, Take by mouth daily, Disp: , Rfl:     Calcium Carbonate-Vit D-Min (CALTRATE 600+D PLUS MINERALS PO), Take 1 tablet by mouth daily, Disp: , Rfl:     Omega-3 Fatty Acids (FISH OIL PO), Take 1 capsule by mouth daily , Disp: , Rfl:     donepezil (ARICEPT) 10 MG tablet, Take 10 mg by mouth nightly , Disp: , Rfl:     GLUCOSAMINE HCL PO, Take 2,000 mg by mouth 2 times daily, Disp: , Rfl:     atorvastatin (LIPITOR) 10 MG tablet, Take 10 mg by mouth daily , Disp: , Rfl:     Multiple Vitamins-Minerals (CENTRUM ADULTS PO), Take 1 tablet by mouth daily , Disp: , Rfl:     hydrochlorothiazide (HYDRODIURIL) 25 MG tablet, Take 25 mg by mouth daily. , Disp: , Rfl:     losartan (COZAAR) 100 MG tablet, Take 100 mg by mouth daily. , Disp: , Rfl:     Docusate Sodium (DSS) 100 MG CAPS, Take 100 mg by mouth 2 times daily as needed. , Disp: 20 capsule, Rfl: 0    potassium chloride (MICRO-K) 10 MEQ CR capsule, Take 10 mEq by mouth 3 times daily , Disp: , Rfl:     aspirin EC 81 MG EC tablet, Take 81 mg by mouth daily. , Disp: , Rfl:     buPROPion (WELLBUTRIN XL) 150 MG XL tablet, Take 150 mg by mouth every morning.  , Disp: , Rfl:     citalopram (CELEXA) 40 MG tablet, Take 40 mg by mouth nightly., Disp: , Rfl:     Levothyroxine Sodium (SYNTHROID PO), Take 88 mcg by mouth daily , Disp: , Rfl:     ALPRAZolam (XANAX) 0.5 MG tablet, Take 0.25 mg by mouth daily. , Disp: , Rfl:     traZODone (DESYREL) 50 MG tablet, Take 25 mg by mouth nightly (Patient not taking: No sig reported), Disp: , Rfl:     memantine (NAMENDA) 5 MG tablet, Take 5 mg by mouth daily (Patient not taking: No sig reported), Disp: , Rfl:     naproxen sodium (ANAPROX) 220 MG tablet, Take 220 mg by mouth as needed for Pain (Patient not taking: Reported on 2/9/2023), Disp: , Rfl:     tamsulosin (FLOMAX) 0.4 MG capsule, Take 0.8 mg by mouth nightly  (Patient not taking: Reported on 2/9/2023), Disp: , Rfl:     NONFORMULARY, Digest-Zyme 1 daily (Patient not taking: No sig reported), Disp: , Rfl:       S: REASON FOR VISIT:       Chief Complaint   Patient presents with    Hypertension     6 mo ov. Patient complains of dizzines. History of Present Illness:        Office Visit for follow up of CAD, CABG, bradycardia              65 yr old Lloyd Crews with history of CAD, s/p CABG in 2013, sinus bradycardia came for f/u visit with his family              C/o Dizzy, daily, most of the day, no syncope-  think its his \"anxiety medication\"   Going to see Neurology this month   Bard Arringtons last May 2022, no syncope; no heart racing prior to fall   No hospitalizations or surgeries since last visit   Patient is compliant with all medications   Luis any exertional chest pain or short of breath   No palpitations or syncope.    Active at home   Try to watch diet          Past Medical History:   Diagnosis Date    Anxiety and depression     CAD (coronary artery disease)     PTCA and stents    Depression     DM (diabetes mellitus) ([facility] [address]     Erectile dysfunction     Essential hypertension 5/23/2016    Gastric ulcer     multiple    H/O scarlet fever     Heart murmur     in childhood, scarlet fever    Hyperlipidemia     Hypertension     Hypogonadism male     Psychiatric disorder     Renal disease     S/P sclerotherapy of varicose veins     both legs    Sleep apnea     in past    Thyroid disease     hypothyroidism            Past Surgical History:   Procedure Laterality Date    APPENDECTOMY      CARDIAC CATHETERIZATION  7/22/2013    [facility]    CARDIAC SURGERY      stents    CATARACT REMOVAL WITH IMPLANT Bilateral     left 12/16 & right 1/17    CHOLECYSTECTOMY      CIRCUMCISION      CORONARY ANGIOPLASTY  01/24/97    Stent LAD    CORONARY ANGIOPLASTY  03/14/11    instent restenosis of previously placed stents    CORONARY ARTERY BYPASS GRAFT  8/6/13    CYSTOURETHROSCOPY/URETHRAL DILATION      DIAGNOSTIC CARDIAC CATH LAB PROCEDURE  1987    [facility]    DIAGNOSTIC CARDIAC CATH LAB PROCEDURE  01/23/97    [facility]    DIAGNOSTIC CARDIAC CATH LAB PROCEDURE  11/25/97    [facility]    DIAGNOSTIC CARDIAC CATH LAB PROCEDURE  02/18/10    [facility], stent/proximal LAD    DIAGNOSTIC CARDIAC CATH LAB PROCEDURE  03/14/11    ALEX mid LAD           Family History   Problem Relation Age of Onset    Diabetes Sister     Hypertension Sister     Elevated Lipids Sister     Coronary Art Dis Sister         s/p CABG          Social History     Tobacco Use    Smoking status: Never    Smokeless tobacco: Never   Substance Use Topics    Alcohol use:  Yes     Alcohol/week: 0.0 standard drinks     Comment: occasional glass of wine         Review of Systems:    Constitutional: negative for fever and chills, or significant weight loss  HEENT: negative for acute visual symptoms or auditory problems, no dysphagia  Respiratory: negative for cough, wheezing, or hemoptysis  Cardiovascular: negative for chest pain, palpitations, and dyspnea  Gastrointestinal: negative for abdominal pain, diarrhea, melena, nausea and vomiting  Endocrine: Negative for polyuria and polydyspsia  Genitourinary: negative for dysuria and hematuria  Derm: negative for rash and skin lesion(s)  Neurological: negative for tingling, numbness, weakness, seizures  Endocrine: negative for polydipsia and polyuria  Musculoskeletal: negative for pain or tenderness  Psychiatric: negative for anxiety, depression, or suicidal ideations         O:  COMPLETE PHYSICAL EXAM:       /62   Pulse 55   Resp 18   Ht 5' 11\" (1.803 m)   Wt 229 lb 3.2 oz (104 kg)   BMI 31.97 kg/m²       General:   Patient alert, comfortable, no distress. Appears stated age. HEENT:    Pupils equal, no icterus; Tongue moist.   Neck:              No masses, Thyroid not palpable. No elevated JVD, No carotid bruit. Chest:   Normal configuration, non tender. Lungs:   Clear to auscultation bilaterally except few scattered rhonchi. Cardiovascular:  Regular rhythm, 1/6 systolic murmur, No S3, no palpable thrills. Abdomen:  Soft, Bowel sounds normal, no pulsatile abdominal aorta, no palpable masses. Extremities:  + edema. Distal pulses palpable. No cyanosis, no clubbing. Skin:   Good turgor, warm and dry, no cyanosis. Musculoskeletal: No joint swelling or deformity. Neuro:   Cranial nerves grossly intact; No focal neurologic deficit. Psych:   Alert, good mood and effect. REVIEW OF DIAGNOSTIC TESTS:        Electrocardiogram: Reviewed      Stress 10/2019 - Noted      [facility] 7/22/2013    IMPRESSION:   1. Significant very proximal LAD lesion. 2.    Preserved LV function. 3.    Patent stents in proximal and mid LAD. ASSESSMENT / PLAN:    Sydnie Alvarado was seen today for hypertension. Diagnoses and all orders for this visit:    Sinus bradycardia - Off Metoprolol, Monitor BP/HR   - EKG 12 Lead  -     Cardiac event monitor; Future    Dizzy  -     Cardiac event monitor;  Future    Coronary artery disease involving native coronary artery of native heart without angina pectoris - s/p PCI of LAD several times; s/p LIMA to LAD in 2013 - Continue ASA, Statin  -No BB due to bradycardia   - EKG 12 Lead     Essential hypertension - Monitor BP; low salt diet, exercise, weight loss discussed     S/P coronary artery bypass graft - off-pump CABG  x1, left internal mammary artery to the LAD on 8/6/2013. Dr Ashley Murray     Non morbid obesity - Diet, exercise and weight loss discussed. Headaches - Advised to see Neurology     Hx of Edema feet - Elevate feet, decrease salt, He was referred to Vascular surgery - Dr Rosa Grossman PA     Preventive Cardiology: Low cholesterol diet, regular exercise as tolerate, and gradual weight loss discussed. Above recommendations discussed with him and his family   The patient's current medication list, allergies, problem list and results of prior tests (as available) were reviewed at today's visit   All questions answered about cardiac diagnoses and cardiac medications. Continue current medications. Monitor BP and heart rates. Compliance with medications and f/u with all physicians discussed. Risk factor modification based on risk profile discussed. Call if any exertional chest pain, short of breath, dizzy or palpitations. Follow up in 6 months or earlier if needed.          [facility]  [address] [place], [address]  [phone]

## 2023-03-17 DIAGNOSIS — R00.1 SINUS BRADYCARDIA: ICD-10-CM

## 2023-03-17 DIAGNOSIS — R42 DIZZY: ICD-10-CM

## 2023-05-31 ENCOUNTER — TELEPHONE (OUTPATIENT)
Dept: CARDIOLOGY CLINIC | Age: 78
End: 2023-05-31

## 2023-05-31 NOTE — TELEPHONE ENCOUNTER
Patient needs cardiac clearance for cystourethroscopy, insertion of permanent adjustable trasprostatic implant. They are req patient hold ASA x7 days. Please advise.

## 2023-06-11 ENCOUNTER — HOSPITAL ENCOUNTER (OUTPATIENT)
Age: 78
Setting detail: OBSERVATION
Discharge: HOME | End: 2023-06-14
Attending: EMERGENCY MEDICINE | Admitting: INTERNAL MEDICINE
Payer: MEDICARE

## 2023-06-11 ENCOUNTER — APPOINTMENT (OUTPATIENT)
Dept: CT IMAGING | Age: 78
End: 2023-06-11
Payer: MEDICARE

## 2023-06-11 DIAGNOSIS — R42 DIZZINESS: Primary | ICD-10-CM

## 2023-06-11 LAB
ALBUMIN SERPL-MCNC: 4.2 G/DL (ref 3.5–5.2)
ALP SERPL-CCNC: 92 U/L (ref 40–129)
ALT SERPL-CCNC: 33 U/L (ref 0–40)
ANION GAP SERPL CALCULATED.3IONS-SCNC: 11 MMOL/L (ref 7–16)
AST SERPL-CCNC: 25 U/L (ref 0–39)
BASOPHILS # BLD: 0.07 E9/L (ref 0–0.2)
BASOPHILS NFR BLD: 0.6 % (ref 0–2)
BILIRUB SERPL-MCNC: 1.6 MG/DL (ref 0–1.2)
BUN SERPL-MCNC: 16 MG/DL (ref 6–23)
CALCIUM SERPL-MCNC: 9.4 MG/DL (ref 8.6–10.2)
CHLORIDE SERPL-SCNC: 101 MMOL/L (ref 98–107)
CO2 SERPL-SCNC: 27 MMOL/L (ref 22–29)
CREAT SERPL-MCNC: 0.8 MG/DL (ref 0.7–1.2)
EOSINOPHIL # BLD: 0.51 E9/L (ref 0.05–0.5)
EOSINOPHIL NFR BLD: 4.5 % (ref 0–6)
ERYTHROCYTE [DISTWIDTH] IN BLOOD BY AUTOMATED COUNT: 12.4 FL (ref 11.5–15)
GLUCOSE SERPL-MCNC: 135 MG/DL (ref 74–99)
HCT VFR BLD AUTO: 42.5 % (ref 37–54)
HGB BLD-MCNC: 14.2 G/DL (ref 12.5–16.5)
IMM GRANULOCYTES # BLD: 0.14 E9/L
IMM GRANULOCYTES NFR BLD: 1.2 % (ref 0–5)
LYMPHOCYTES # BLD: 1.4 E9/L (ref 1.5–4)
LYMPHOCYTES NFR BLD: 12.5 % (ref 20–42)
MCH RBC QN AUTO: 31.1 PG (ref 26–35)
MCHC RBC AUTO-ENTMCNC: 33.4 % (ref 32–34.5)
MCV RBC AUTO: 93.2 FL (ref 80–99.9)
MONOCYTES # BLD: 0.84 E9/L (ref 0.1–0.95)
MONOCYTES NFR BLD: 7.5 % (ref 2–12)
NEUTROPHILS # BLD: 8.25 E9/L (ref 1.8–7.3)
NEUTS SEG NFR BLD: 73.7 % (ref 43–80)
PLATELET # BLD AUTO: 233 E9/L (ref 130–450)
PMV BLD AUTO: 10.2 FL (ref 7–12)
POTASSIUM SERPL-SCNC: 4 MMOL/L (ref 3.5–5)
PROT SERPL-MCNC: 7 G/DL (ref 6.4–8.3)
RBC # BLD AUTO: 4.56 E12/L (ref 3.8–5.8)
SODIUM SERPL-SCNC: 139 MMOL/L (ref 132–146)
TROPONIN, HIGH SENSITIVITY: 15 NG/L (ref 0–11)
TROPONIN, HIGH SENSITIVITY: 16 NG/L (ref 0–11)
WBC # BLD: 11.2 E9/L (ref 4.5–11.5)

## 2023-06-11 PROCEDURE — 99285 EMERGENCY DEPT VISIT HI MDM: CPT

## 2023-06-11 PROCEDURE — 80053 COMPREHEN METABOLIC PANEL: CPT

## 2023-06-11 PROCEDURE — 85025 COMPLETE CBC W/AUTO DIFF WBC: CPT

## 2023-06-11 PROCEDURE — 93005 ELECTROCARDIOGRAM TRACING: CPT

## 2023-06-11 PROCEDURE — 70450 CT HEAD/BRAIN W/O DYE: CPT

## 2023-06-11 PROCEDURE — 84484 ASSAY OF TROPONIN QUANT: CPT

## 2023-06-11 PROCEDURE — 36415 COLL VENOUS BLD VENIPUNCTURE: CPT

## 2023-06-11 PROCEDURE — G0378 HOSPITAL OBSERVATION PER HR: HCPCS

## 2023-06-11 RX ORDER — ONDANSETRON 2 MG/ML
4 INJECTION INTRAMUSCULAR; INTRAVENOUS EVERY 6 HOURS PRN
Status: DISCONTINUED | OUTPATIENT
Start: 2023-06-11 | End: 2023-06-15 | Stop reason: HOSPADM

## 2023-06-11 RX ORDER — SODIUM CHLORIDE 0.9 % (FLUSH) 0.9 %
10 SYRINGE (ML) INJECTION EVERY 12 HOURS SCHEDULED
Status: DISCONTINUED | OUTPATIENT
Start: 2023-06-12 | End: 2023-06-15 | Stop reason: HOSPADM

## 2023-06-11 RX ORDER — ASPIRIN 300 MG/1
300 SUPPOSITORY RECTAL DAILY
Status: DISCONTINUED | OUTPATIENT
Start: 2023-06-12 | End: 2023-06-12

## 2023-06-11 RX ORDER — ASPIRIN 81 MG/1
81 TABLET ORAL DAILY
Status: DISCONTINUED | OUTPATIENT
Start: 2023-06-12 | End: 2023-06-12

## 2023-06-11 RX ORDER — GABAPENTIN 100 MG/1
100 CAPSULE ORAL
Status: ON HOLD | COMMUNITY
End: 2023-06-18 | Stop reason: HOSPADM

## 2023-06-11 RX ORDER — ENOXAPARIN SODIUM 100 MG/ML
40 INJECTION SUBCUTANEOUS DAILY
Status: DISCONTINUED | OUTPATIENT
Start: 2023-06-12 | End: 2023-06-15 | Stop reason: HOSPADM

## 2023-06-11 RX ORDER — SODIUM CHLORIDE 0.9 % (FLUSH) 0.9 %
10 SYRINGE (ML) INJECTION PRN
Status: DISCONTINUED | OUTPATIENT
Start: 2023-06-11 | End: 2023-06-15 | Stop reason: HOSPADM

## 2023-06-11 RX ORDER — ATORVASTATIN CALCIUM 40 MG/1
40 TABLET, FILM COATED ORAL NIGHTLY
Status: DISCONTINUED | OUTPATIENT
Start: 2023-06-12 | End: 2023-06-12

## 2023-06-11 RX ORDER — ONDANSETRON 4 MG/1
4 TABLET, ORALLY DISINTEGRATING ORAL EVERY 8 HOURS PRN
Status: DISCONTINUED | OUTPATIENT
Start: 2023-06-11 | End: 2023-06-15 | Stop reason: HOSPADM

## 2023-06-11 RX ORDER — SODIUM CHLORIDE 9 MG/ML
INJECTION, SOLUTION INTRAVENOUS PRN
Status: DISCONTINUED | OUTPATIENT
Start: 2023-06-11 | End: 2023-06-15 | Stop reason: HOSPADM

## 2023-06-12 LAB
EKG ATRIAL RATE: 63 BPM
EKG P AXIS: 40 DEGREES
EKG P-R INTERVAL: 192 MS
EKG Q-T INTERVAL: 462 MS
EKG QRS DURATION: 106 MS
EKG QTC CALCULATION (BAZETT): 472 MS
EKG R AXIS: -7 DEGREES
EKG T AXIS: 93 DEGREES
EKG VENTRICULAR RATE: 63 BPM
ERYTHROCYTE [DISTWIDTH] IN BLOOD BY AUTOMATED COUNT: 12.2 FL (ref 11.5–15)
HBA1C MFR BLD: 6.4 % (ref 4–5.6)
HCT VFR BLD AUTO: 39.5 % (ref 37–54)
HGB BLD-MCNC: 13.6 G/DL (ref 12.5–16.5)
INR BLD: 1.1
LV EF: 60 %
LVEF MODALITY: NORMAL
MCH RBC QN AUTO: 31.7 PG (ref 26–35)
MCHC RBC AUTO-ENTMCNC: 34.4 % (ref 32–34.5)
MCV RBC AUTO: 92.1 FL (ref 80–99.9)
METER GLUCOSE: 99 MG/DL (ref 74–99)
PLATELET # BLD AUTO: 206 E9/L (ref 130–450)
PMV BLD AUTO: 10.5 FL (ref 7–12)
PROTHROMBIN TIME: 12.3 SEC (ref 9.3–12.4)
RBC # BLD AUTO: 4.29 E12/L (ref 3.8–5.8)
WBC # BLD: 11.3 E9/L (ref 4.5–11.5)

## 2023-06-12 PROCEDURE — 99222 1ST HOSP IP/OBS MODERATE 55: CPT | Performed by: PSYCHIATRY & NEUROLOGY

## 2023-06-12 PROCEDURE — 96372 THER/PROPH/DIAG INJ SC/IM: CPT

## 2023-06-12 PROCEDURE — 85610 PROTHROMBIN TIME: CPT

## 2023-06-12 PROCEDURE — 97530 THERAPEUTIC ACTIVITIES: CPT

## 2023-06-12 PROCEDURE — 83036 HEMOGLOBIN GLYCOSYLATED A1C: CPT

## 2023-06-12 PROCEDURE — 92523 SPEECH SOUND LANG COMPREHEN: CPT

## 2023-06-12 PROCEDURE — G0378 HOSPITAL OBSERVATION PER HR: HCPCS

## 2023-06-12 PROCEDURE — 82962 GLUCOSE BLOOD TEST: CPT

## 2023-06-12 PROCEDURE — 6360000002 HC RX W HCPCS: Performed by: INTERNAL MEDICINE

## 2023-06-12 PROCEDURE — 2580000003 HC RX 258: Performed by: INTERNAL MEDICINE

## 2023-06-12 PROCEDURE — 93010 ELECTROCARDIOGRAM REPORT: CPT | Performed by: INTERNAL MEDICINE

## 2023-06-12 PROCEDURE — 97165 OT EVAL LOW COMPLEX 30 MIN: CPT

## 2023-06-12 PROCEDURE — 85027 COMPLETE CBC AUTOMATED: CPT

## 2023-06-12 PROCEDURE — 36415 COLL VENOUS BLD VENIPUNCTURE: CPT

## 2023-06-12 PROCEDURE — 6370000000 HC RX 637 (ALT 250 FOR IP): Performed by: INTERNAL MEDICINE

## 2023-06-12 PROCEDURE — 93306 TTE W/DOPPLER COMPLETE: CPT

## 2023-06-12 RX ORDER — OXYBUTYNIN CHLORIDE 10 MG/1
10 TABLET, EXTENDED RELEASE ORAL DAILY
Status: DISCONTINUED | OUTPATIENT
Start: 2023-06-12 | End: 2023-06-15 | Stop reason: HOSPADM

## 2023-06-12 RX ORDER — ATORVASTATIN CALCIUM 10 MG/1
10 TABLET, FILM COATED ORAL DAILY
Status: DISCONTINUED | OUTPATIENT
Start: 2023-06-12 | End: 2023-06-15 | Stop reason: HOSPADM

## 2023-06-12 RX ORDER — GABAPENTIN 100 MG/1
100 CAPSULE ORAL
Status: DISCONTINUED | OUTPATIENT
Start: 2023-06-12 | End: 2023-06-15 | Stop reason: HOSPADM

## 2023-06-12 RX ORDER — BUPROPION HYDROCHLORIDE 150 MG/1
150 TABLET ORAL EVERY MORNING
Status: DISCONTINUED | OUTPATIENT
Start: 2023-06-12 | End: 2023-06-15 | Stop reason: HOSPADM

## 2023-06-12 RX ORDER — LEVOTHYROXINE SODIUM 88 UG/1
88 TABLET ORAL DAILY
Status: DISCONTINUED | OUTPATIENT
Start: 2023-06-12 | End: 2023-06-15 | Stop reason: HOSPADM

## 2023-06-12 RX ORDER — INSULIN LISPRO 100 [IU]/ML
0-4 INJECTION, SOLUTION INTRAVENOUS; SUBCUTANEOUS
Status: DISCONTINUED | OUTPATIENT
Start: 2023-06-12 | End: 2023-06-12

## 2023-06-12 RX ORDER — HYDROCHLOROTHIAZIDE 25 MG/1
25 TABLET ORAL DAILY
Status: DISCONTINUED | OUTPATIENT
Start: 2023-06-12 | End: 2023-06-15 | Stop reason: HOSPADM

## 2023-06-12 RX ORDER — DONEPEZIL HYDROCHLORIDE 5 MG/1
10 TABLET, FILM COATED ORAL NIGHTLY
Status: DISCONTINUED | OUTPATIENT
Start: 2023-06-12 | End: 2023-06-15 | Stop reason: HOSPADM

## 2023-06-12 RX ORDER — LOSARTAN POTASSIUM 50 MG/1
100 TABLET ORAL DAILY
Status: DISCONTINUED | OUTPATIENT
Start: 2023-06-12 | End: 2023-06-15 | Stop reason: HOSPADM

## 2023-06-12 RX ORDER — AMLODIPINE BESYLATE 10 MG/1
10 TABLET ORAL DAILY
Status: DISCONTINUED | OUTPATIENT
Start: 2023-06-12 | End: 2023-06-15 | Stop reason: HOSPADM

## 2023-06-12 RX ORDER — ALPRAZOLAM 0.25 MG/1
0.25 TABLET ORAL DAILY PRN
Status: DISCONTINUED | OUTPATIENT
Start: 2023-06-12 | End: 2023-06-15 | Stop reason: HOSPADM

## 2023-06-12 RX ORDER — CITALOPRAM 20 MG/1
40 TABLET ORAL NIGHTLY
Status: DISCONTINUED | OUTPATIENT
Start: 2023-06-12 | End: 2023-06-15 | Stop reason: HOSPADM

## 2023-06-12 RX ORDER — MECOBALAMIN 5000 MCG
5 TABLET,DISINTEGRATING ORAL NIGHTLY PRN
Status: DISCONTINUED | OUTPATIENT
Start: 2023-06-12 | End: 2023-06-15 | Stop reason: HOSPADM

## 2023-06-12 RX ORDER — ASPIRIN 81 MG/1
81 TABLET ORAL DAILY
Status: DISCONTINUED | OUTPATIENT
Start: 2023-06-12 | End: 2023-06-15 | Stop reason: HOSPADM

## 2023-06-12 RX ORDER — ALPRAZOLAM 0.25 MG/1
0.25 TABLET ORAL DAILY
Status: DISCONTINUED | OUTPATIENT
Start: 2023-06-12 | End: 2023-06-12

## 2023-06-12 RX ORDER — INSULIN LISPRO 100 [IU]/ML
0-4 INJECTION, SOLUTION INTRAVENOUS; SUBCUTANEOUS NIGHTLY
Status: DISCONTINUED | OUTPATIENT
Start: 2023-06-12 | End: 2023-06-12

## 2023-06-12 RX ORDER — DEXTROSE MONOHYDRATE 100 MG/ML
INJECTION, SOLUTION INTRAVENOUS CONTINUOUS PRN
Status: DISCONTINUED | OUTPATIENT
Start: 2023-06-12 | End: 2023-06-15 | Stop reason: HOSPADM

## 2023-06-12 RX ADMIN — OXYBUTYNIN CHLORIDE 10 MG: 10 TABLET, EXTENDED RELEASE ORAL at 08:59

## 2023-06-12 RX ADMIN — CITALOPRAM HYDROBROMIDE 40 MG: 20 TABLET ORAL at 20:29

## 2023-06-12 RX ADMIN — Medication 10 ML: at 20:29

## 2023-06-12 RX ADMIN — BUPROPION HYDROCHLORIDE 150 MG: 150 TABLET, EXTENDED RELEASE ORAL at 08:51

## 2023-06-12 RX ADMIN — LOSARTAN POTASSIUM 100 MG: 50 TABLET, FILM COATED ORAL at 08:48

## 2023-06-12 RX ADMIN — AMLODIPINE BESYLATE 10 MG: 10 TABLET ORAL at 08:46

## 2023-06-12 RX ADMIN — GABAPENTIN 100 MG: 100 CAPSULE ORAL at 20:28

## 2023-06-12 RX ADMIN — ALPRAZOLAM 0.25 MG: 0.25 TABLET ORAL at 08:47

## 2023-06-12 RX ADMIN — Medication 10 ML: at 08:52

## 2023-06-12 RX ADMIN — ATORVASTATIN CALCIUM 10 MG: 10 TABLET, FILM COATED ORAL at 08:51

## 2023-06-12 RX ADMIN — HYDROCHLOROTHIAZIDE 25 MG: 25 TABLET ORAL at 08:47

## 2023-06-12 RX ADMIN — ENOXAPARIN SODIUM 40 MG: 100 INJECTION SUBCUTANEOUS at 08:47

## 2023-06-12 ASSESSMENT — LIFESTYLE VARIABLES
HOW MANY STANDARD DRINKS CONTAINING ALCOHOL DO YOU HAVE ON A TYPICAL DAY: 1 OR 2
HOW OFTEN DO YOU HAVE A DRINK CONTAINING ALCOHOL: MONTHLY OR LESS

## 2023-06-12 NOTE — ACP (ADVANCE CARE PLANNING)
Advance Care Planning   The patient has the following advanced directives on file:  Advance Directives       Power of [address] Will ACP-Advance Directive ACP-Power of     Not on File Filed on 06/18/12 Filed Not on File            The patient has appointed the following active healthcare agents:       The Patient has the following current code status:    Code Status: Full Code      Belinda Busby RN  6/12/2023

## 2023-06-12 NOTE — H&P
CIRCUMCISION      CORONARY ANGIOPLASTY  01/24/97    Stent LAD    CORONARY ANGIOPLASTY  03/14/11    instent restenosis of previously placed stents    CORONARY ARTERY BYPASS GRAFT  8/6/13    CYSTOURETHROSCOPY/URETHRAL DILATION      DIAGNOSTIC CARDIAC CATH LAB PROCEDURE  1987    [facility]    DIAGNOSTIC CARDIAC CATH LAB PROCEDURE  01/23/97    [facility]    DIAGNOSTIC CARDIAC CATH LAB PROCEDURE  11/25/97    [facility]    DIAGNOSTIC CARDIAC CATH LAB PROCEDURE  02/18/10    [facility], stent/proximal LAD    DIAGNOSTIC CARDIAC CATH LAB PROCEDURE  03/14/11    ALEX mid LAD        Medications Prior to Admission:      Prior to Admission medications    Medication Sig Start Date End Date Taking? Authorizing Provider   gabapentin (NEURONTIN) 100 MG capsule Take 1 capsule by mouth nightly. Yes Historical Provider, MD   alfuzosin (UROXATRAL) 10 MG extended release tablet TAKE ONE TABLET BY MOUTH EVERY DAY 1/25/23   Historical Provider, MD   amLODIPine (NORVASC) 10 MG tablet Take 1 tablet by mouth in the morning.  8/3/22   Martin Baez MD   melatonin 5 MG TABS tablet Take 1 tablet by mouth nightly as needed  Patient not taking: Reported on 6/12/2023    Historical Provider, MD   omeprazole (PRILOSEC) 20 MG delayed release capsule Take 1 capsule by mouth daily    Historical Provider, MD   oxybutynin (DITROPAN-XL) 10 MG extended release tablet Take 1 tablet by mouth daily    Historical Provider, MD   traZODone (DESYREL) 50 MG tablet Take 25 mg by mouth nightly  Patient not taking: Reported on 7/13/2022    Historical Provider, MD   memantine (NAMENDA) 5 MG tablet Take 5 mg by mouth daily  Patient not taking: Reported on 7/13/2022    Historical Provider, MD   Ascorbic Acid (VITAMIN C) 500 MG CAPS Take by mouth daily    Historical Provider, MD   Calcium Carbonate-Vit D-Min (CALTRATE 600+D PLUS MINERALS PO) Take 1 tablet by mouth daily    Historical Provider, MD   naproxen sodium (ANAPROX) 220 MG tablet Take 220 mg by mouth as needed for Pain  Patient not

## 2023-06-12 NOTE — PROGRESS NOTES
4 Eyes Skin Assessment     NAME:  Lance Schafer  YOB: 1945  MEDICAL RECORD NUMBER:  [id number]    The patient is being assessed for  Admission    I agree that at least one RN has performed a thorough Head to Toe Skin Assessment on the patient. ALL assessment sites listed below have been assessed. Areas assessed by both nurses:    Head, Face, Ears, Shoulders, Back, Chest, Arms, Elbows, Hands, Sacrum. Buttock, Coccyx, Ischium, and Legs. Feet and Heels        Does the Patient have a Wound?  No noted wound(s)       Duncan Prevention initiated by RN: No  Wound Care Orders initiated by RN: Yes    Pressure Injury (Stage 3,4, Unstageable, DTI, NWPT, and Complex wounds) if present, place Wound referral order by RN under : No    New Ostomies, if present place, Ostomy referral order under : No     Nurse 1 eSignature: Electronically signed by Lamont Carmona RN on 6/12/23 at 6:39 AM EDT    **SHARE this note so that the co-signing nurse can place an eSignature**    Nurse 2 eSignature: Electronically signed by Constantine Scheuermann, RN on 6/12/23 at 6:41 AM EDT

## 2023-06-12 NOTE — PROGRESS NOTES
OCCUPATIONAL THERAPY INITIAL EVALUATION     [email or web address] [place], [place]     FLBB:                                                  Patient Name: Felicia Chatterjee    MRN: [id number]    : 1945    Room: [address] [place] [address]      Evaluating OT: Naomy Manrique OTR/L [id number]      Referring Provider: Harjinder Graf MD    Specific Provider Orders/Date: 3- OT eval and treat       Diagnosis: Dizziness     Surgery:    Past Surgical History:   Procedure Laterality Date    APPENDECTOMY      CARDIAC CATHETERIZATION  2013    [facility]    CARDIAC SURGERY      stents    CATARACT REMOVAL WITH IMPLANT Bilateral     left  & right     CHOLECYSTECTOMY      CIRCUMCISION      CORONARY ANGIOPLASTY  97    Stent LAD    CORONARY ANGIOPLASTY  11    instent restenosis of previously placed stents    CORONARY ARTERY BYPASS GRAFT  13    CYSTOURETHROSCOPY/URETHRAL DILATION      DIAGNOSTIC CARDIAC CATH LAB PROCEDURE      [facility]    DIAGNOSTIC CARDIAC CATH LAB PROCEDURE  97    [facility]    DIAGNOSTIC CARDIAC CATH LAB PROCEDURE  97    [facility]    DIAGNOSTIC CARDIAC CATH LAB PROCEDURE  02/18/10    [facility], stent/proximal LAD    DIAGNOSTIC CARDIAC CATH LAB PROCEDURE  11    ALEX mid LAD         Pertinent Medical History:   Past Medical History:   Diagnosis Date    Anxiety and depression     CAD (coronary artery disease)     PTCA and stents    Depression     DM (diabetes mellitus) ([facility] [address]     Erectile dysfunction     Essential hypertension 2016    Gastric ulcer     multiple    H/O scarlet fever     Heart murmur     in childhood, scarlet fever    Hyperlipidemia     Hypertension     Hypogonadism male     Psychiatric disorder     Renal disease     S/P sclerotherapy of varicose veins     both legs    Sleep apnea     in past    Thyroid disease     hypothyroidism          Past Medical History:   Diagnosis Date    Anxiety and depression

## 2023-06-12 NOTE — ED NOTES
Report called, rn and room ready, vitals stable, nad, transport notified.       Robson Mclean RN  06/11/23 1933

## 2023-06-12 NOTE — PLAN OF CARE
Problem: Chronic Conditions and Co-morbidities  Goal: Patient's chronic conditions and co-morbidity symptoms are monitored and maintained or improved  Outcome: Progressing     Problem: Discharge Planning  Goal: Discharge to home or other facility with appropriate resources  Outcome: Progressing  Flowsheets (Taken 6/12/2023 0004)  Discharge to home or other facility with appropriate resources:   Identify barriers to discharge with patient and caregiver   Identify discharge learning needs (meds, wound care, etc)   Refer to discharge planning if patient needs post-hospital services based on physician order or complex needs related to functional status, cognitive ability or social support system     Problem: Safety - Adult  Goal: Free from fall injury  Outcome: Progressing

## 2023-06-12 NOTE — PROGRESS NOTES
Hospitalist Progress Note      Synopsis:   Briefly, patient with PMH significant for CAD, DM, HTN, gastric ulcer, heart murmur, HPL, chronic kidney disease, sleep apnea, thyroid disease presented to the ED with complaints of dizziness. Patient states she has had complaint for 1 year, states that it began after fall from steps where he hit his head on driveway, though it has gotten worse over the last week. Hospital course thus far unremarkable, CT of head showed no acute intercranial abnormality though chronic parenchymal atrophy, old white matter ischemia and stable ventriculomegaly was appreciated. Orthostatic blood pressures were negative. Neurology was consulted and MRI of brain was ordered. Hospital day 0     Subjective:  Seen and examined at bedside, states she got up to go to bathroom and felt extremely dizzy. States dizziness started after fall was subsequently hitting his head. Worsened over last week. Stable overnight. No issues reported. Patient seen and examined  Records reviewed. Temp (24hrs), Av.8 °F (36.6 °C), Min:97.3 °F (36.3 °C), Max:98.4 °F (36.9 °C)    DIET: ADULT DIET; Regular  CODE: Full Code  No intake or output data in the 24 hours ending 23 1102      Objective:    BP (!) 172/76   Pulse 62   Temp 97.4 °F (36.3 °C) (Tympanic)   Resp 16   Ht 5' 11\" (1.803 m)   Wt 229 lb 4.5 oz (104 kg)   SpO2 97%   BMI 31.98 kg/m²     General appearance: No apparent distress, appears stated age and cooperative. HEENT: Conjunctivae/corneas clear. Mucous membranes moist.  Neck: Supple. No JVD. Respiratory:  Clear to auscultation bilaterally. Normal respiratory effort. Cardiovascular:  RRR. S1, S2 without MRG. PV: Pulses palpable. No edema. Abdomen: Soft, non-tender, non-distended. +BS  Musculoskeletal: No obvious deformities. Skin: Normal skin color. No rashes or lesions. Good turgor. Neurologic:  Grossly non-focal. Awake, alert, following commands.    Psychiatric:

## 2023-06-12 NOTE — ED PROVIDER NOTES
ATTENDING PROVIDER ATTESTATION:     Jerson Cody presented to the emergency department for evaluation of Headache (Ongoing for \"months\"), Dizziness (Ongoing for \"months\". On BP medications. States that he takes meds as directed.), and Urinary Frequency (Ongoing for \"months\" but states that is it getting worse. )   and was initially evaluated by the Medical Resident. See Original ED Note for H&P and ED course above. I have reviewed and discussed the case, including pertinent history (medical, surgical, family and social) and exam findings with the Medical Resident assigned to Jerson Cody. I have personally performed and/or participated in the history, exam, medical decision making, and present for critical portion or entire portion of all procedures and agree with all pertinent clinical information and any additional changes or corrections are noted below in my assessment and plan. I have discussed this patient in detail with the resident, and provided the instruction and education,       I have reviewed my findings and recommendations with the assigned Medical Resident, Jerson Cody and members of family present at the time of disposition. I have performed a history and physical examination of this patient and directly supervised the resident caring for this patient      Please refer to my separate ED provider note with my attestation and my separate documentation of the encounter. [address]        Pt Name: Jerson Cody  MRN: [id number]  [place] 1945  Date of evaluation: 6/11/2023  Provider: Joseph Mcelroy DO  PCP: Cm Smiley DO  Note Started: 9:00 PM EDT 6/11/23    CHIEF COMPLAINT       Chief Complaint   Patient presents with    Headache     Ongoing for \"months\"    Dizziness     Ongoing for \"months\". On BP medications. States that he takes meds as directed.     Urinary

## 2023-06-12 NOTE — PATIENT CARE CONFERENCE
[facility] Quality Flow/Interdisciplinary Rounds Progress Note        Quality Flow Rounds held on June 12, 2023    Disciplines Attending:  Bedside Nurse, , , and Nursing Unit Leadership    Gisella Martins was admitted on 6/11/2023  6:07 PM    Anticipated Discharge Date:  Expected Discharge Date: 06/14/23    Disposition:    Duncan Score:  Duncan Scale Score: 19    Readmission Risk              Risk of Unplanned Readmission:  0           Discussed patient goal for the day, patient clinical progression, and barriers to discharge.   The following Goal(s) of the Day/Commitment(s) have been identified:  Diagnostics - Report Results and Labs - Report Results      Zion Wetzel RN  June 12, 2023

## 2023-06-12 NOTE — CARE COORDINATION
Chart reviewed for transition of care at discharge. Patient is an observation for dizziness. For echo and MRI of the brain. Neurology awaiting results for disposition. Met with patient in his room who stated that he lives with his wife in a ranch home with 3 steps to enter. He is independent with ADL's, and uses no assistive devices to ambulate. He has no medical equipment at home, and has no history of HHC or inpatient rehab. If needed, he would like [facility]. Referral called to [facility] to follow. His PCP is Dr Nichole Zheng, and pharmacy is [facility] in [place]. His plan is to discharge home with wife transporting home when medically stable. CM/SW will follow. Electronically signed by Marilu Martinez RN on 6/12/2023 at 2:47 PM    Case Management Assessment  Initial Evaluation    Date/Time of Evaluation: 6/12/2023 2:48 PM  Assessment Completed by: Marilu Martinez RN    If patient is discharged prior to next notation, then this note serves as note for discharge by case management. Patient Name: Norris Rajan                   YOB: 1945  Diagnosis: Dizziness [R42]                   Date / Time: 6/11/2023  6:07 PM    Patient Admission Status: Observation   Readmission Risk (Low < 19, Mod (19-27), High > 27): No data recorded  Current PCP: Tatyana Davis, DO  PCP verified by CM? Yes    Chart Reviewed: Yes      History Provided by: Patient  Patient Orientation: Alert and Oriented, Person, Place, Situation    Patient Cognition: Alert    Hospitalization in the last 30 days (Readmission):  No    If yes, Readmission Assessment in  Navigator will be completed.     Advance Directives:      Code Status: Full Code   Patient's Primary Decision Maker is: Legal Next of Kin      Discharge Planning:    Patient lives with: Spouse/Significant Other Type of Home: House  Primary Care Giver: Self  Patient Support Systems include: Spouse/Significant Other   Current Financial resources:    Current community resources:    Current

## 2023-06-12 NOTE — CONSULTS
ANGIOPLASTY  01/24/97    Stent LAD    CORONARY ANGIOPLASTY  03/14/11    instent restenosis of previously placed stents    CORONARY ARTERY BYPASS GRAFT  8/6/13    CYSTOURETHROSCOPY/URETHRAL DILATION      DIAGNOSTIC CARDIAC CATH LAB PROCEDURE  1987    [facility]    DIAGNOSTIC CARDIAC CATH LAB PROCEDURE  01/23/97    [facility]    DIAGNOSTIC CARDIAC CATH LAB PROCEDURE  11/25/97    [facility]    DIAGNOSTIC CARDIAC CATH LAB PROCEDURE  02/18/10    [facility], stent/proximal LAD    DIAGNOSTIC CARDIAC CATH LAB PROCEDURE  03/14/11    ALEX mid LAD         Family History:   Family History   Problem Relation Age of Onset    Diabetes Sister     Hypertension Sister     Elevated Lipids Sister     Coronary Art Dis Sister         s/p CABG       Social History:  Social History     Tobacco Use    Smoking status: Never    Smokeless tobacco: Never   Vaping Use    Vaping Use: Never used   Substance Use Topics    Alcohol use:  Yes     Alcohol/week: 0.0 standard drinks     Comment: occasional glass of wine    Drug use: Never        Current Medications:      Current Facility-Administered Medications   Medication Dose Route Frequency Provider Last Rate Last Admin    ALPRAZolam (XANAX) tablet 0.25 mg  0.25 mg Oral Daily Susi Sotomayor MD        amLODIPine (NORVASC) tablet 10 mg  10 mg Oral Daily Angie Hernandes MD        [Held by provider] aspirin EC tablet 81 mg  81 mg Oral Daily Susi Sotomayor MD        atorvastatin (LIPITOR) tablet 10 mg  10 mg Oral Daily Susi Sotomayor MD        buPROPion (WELLBUTRIN XL) extended release tablet 150 mg  150 mg Oral QAM Susi Sotomayor MD        citalopram (CELEXA) tablet 40 mg  40 mg Oral Nightly Susi Sotomayor MD        donepezil (ARICEPT) tablet 10 mg  10 mg Oral Nightly Susi Sotomayor MD        gabapentin (NEURONTIN) capsule 100 mg  100 mg Oral QHS Susi Sotomayor MD        hydroCHLOROthiazide (HYDRODIURIL) tablet 25 mg  25 mg Oral Daily Angie Hernandes MD        levothyroxine (SYNTHROID) tablet 88 mcg  88 mcg Oral Daily Angie Hernandes MD

## 2023-06-12 NOTE — PROGRESS NOTES
SPEECH/LANGUAGE PATHOLOGY  SPEECH/LANGUAGE/COGNITIVE EVALUATION   and PLAN OF CARE      PATIENT NAME:  Elliott Beckman  (male)     MRN:  [id number]    :  1945  (77 y.o.)  STATUS:  Inpatient: Room 6418/6418-B    TODAY'S DATE:  2023  REFERRING PROVIDER:  Amari Ortega MD   SPECIFIC PROVIDER ORDER: speech language pathology (SLP) eval and treat  Date of order:  23  REASON FOR REFERRAL: dizziness  EVALUATING THERAPIST: JANICE Hays    ADMITTING DIAGNOSIS: Dizziness [R42]    VISIT DIAGNOSIS:      SPEECH THERAPY  PLAN OF CARE   The speech therapy  POC is established based on physician order, speech pathology diagnosis and results of clinical assessment     SPEECH PATHOLOGY DIAGNOSIS:    Within function limits    Speech Pathology intervention is not warranted at this time. Conditions Requiring Skilled Therapeutic Intervention for speech, language and/or cognition  Not applicable     Specific Speech Therapy Interventions to Include:   Not applicable    Specific instructions for next treatment:    Not applicable    SHORT/LONG TERM GOALS  Not applicable      Patient goals: Patient/family involved in developing goals and treatment plan:   Treatment goals discussed with Patient                CLINICAL ASSESSMENT:  MOTOR SPEECH       Oral Peripheral Examination   Adequate lingual/labial strength     Parameters of Speech Production  Respiration:  Adequate for speech production  Articulation:  Within functional limits  Resonance:  Within functional limits  Quality:   Within functional limits  Pitch: Within functional limits  Intensity: Within functional limits  Fluency:  Intact  Prosody Intact    RECEPTIVE LANGUAGE    Comprehension of Yes/No Questions:    Within functional limits    Process  Simple Verbal Commands:   Within functional limits  Process Intermediate Verbal Commands:   Within functional limits  Process Complex Verbal Commands:     Within functional limits    Comprehension of

## 2023-06-13 ENCOUNTER — APPOINTMENT (OUTPATIENT)
Dept: MRI IMAGING | Age: 78
End: 2023-06-13
Payer: MEDICARE

## 2023-06-13 LAB
ALBUMIN SERPL-MCNC: 3.8 G/DL (ref 3.5–5.2)
ALP SERPL-CCNC: 83 U/L (ref 40–129)
ALT SERPL-CCNC: 32 U/L (ref 0–40)
ANION GAP SERPL CALCULATED.3IONS-SCNC: 15 MMOL/L (ref 7–16)
AST SERPL-CCNC: 25 U/L (ref 0–39)
BILIRUB SERPL-MCNC: 1.6 MG/DL (ref 0–1.2)
BUN SERPL-MCNC: 27 MG/DL (ref 6–23)
CALCIUM SERPL-MCNC: 9.2 MG/DL (ref 8.6–10.2)
CHLORIDE SERPL-SCNC: 103 MMOL/L (ref 98–107)
CO2 SERPL-SCNC: 23 MMOL/L (ref 22–29)
CREAT SERPL-MCNC: 1 MG/DL (ref 0.7–1.2)
ERYTHROCYTE [DISTWIDTH] IN BLOOD BY AUTOMATED COUNT: 12.3 FL (ref 11.5–15)
GLUCOSE SERPL-MCNC: 117 MG/DL (ref 74–99)
HCT VFR BLD AUTO: 39.7 % (ref 37–54)
HGB BLD-MCNC: 13.3 G/DL (ref 12.5–16.5)
MCH RBC QN AUTO: 31.1 PG (ref 26–35)
MCHC RBC AUTO-ENTMCNC: 33.5 % (ref 32–34.5)
MCV RBC AUTO: 93 FL (ref 80–99.9)
METER GLUCOSE: 107 MG/DL (ref 74–99)
PLATELET # BLD AUTO: 216 E9/L (ref 130–450)
PMV BLD AUTO: 10.7 FL (ref 7–12)
POTASSIUM SERPL-SCNC: 3.7 MMOL/L (ref 3.5–5)
PROT SERPL-MCNC: 6.5 G/DL (ref 6.4–8.3)
RBC # BLD AUTO: 4.27 E12/L (ref 3.8–5.8)
SODIUM SERPL-SCNC: 141 MMOL/L (ref 132–146)
WBC # BLD: 13.4 E9/L (ref 4.5–11.5)

## 2023-06-13 PROCEDURE — 6370000000 HC RX 637 (ALT 250 FOR IP): Performed by: INTERNAL MEDICINE

## 2023-06-13 PROCEDURE — 85027 COMPLETE CBC AUTOMATED: CPT

## 2023-06-13 PROCEDURE — 80053 COMPREHEN METABOLIC PANEL: CPT

## 2023-06-13 PROCEDURE — 6360000002 HC RX W HCPCS: Performed by: INTERNAL MEDICINE

## 2023-06-13 PROCEDURE — 2580000003 HC RX 258: Performed by: INTERNAL MEDICINE

## 2023-06-13 PROCEDURE — 70553 MRI BRAIN STEM W/O & W/DYE: CPT

## 2023-06-13 PROCEDURE — A9577 INJ MULTIHANCE: HCPCS | Performed by: RADIOLOGY

## 2023-06-13 PROCEDURE — 96372 THER/PROPH/DIAG INJ SC/IM: CPT

## 2023-06-13 PROCEDURE — G0378 HOSPITAL OBSERVATION PER HR: HCPCS

## 2023-06-13 PROCEDURE — 82962 GLUCOSE BLOOD TEST: CPT

## 2023-06-13 PROCEDURE — 36415 COLL VENOUS BLD VENIPUNCTURE: CPT

## 2023-06-13 PROCEDURE — 6360000004 HC RX CONTRAST MEDICATION: Performed by: RADIOLOGY

## 2023-06-13 RX ADMIN — GABAPENTIN 100 MG: 100 CAPSULE ORAL at 22:50

## 2023-06-13 RX ADMIN — AMLODIPINE BESYLATE 10 MG: 10 TABLET ORAL at 08:07

## 2023-06-13 RX ADMIN — CITALOPRAM HYDROBROMIDE 40 MG: 20 TABLET ORAL at 22:50

## 2023-06-13 RX ADMIN — BUPROPION HYDROCHLORIDE 150 MG: 150 TABLET, EXTENDED RELEASE ORAL at 08:07

## 2023-06-13 RX ADMIN — LEVOTHYROXINE SODIUM 88 MCG: 0.09 TABLET ORAL at 06:13

## 2023-06-13 RX ADMIN — ATORVASTATIN CALCIUM 10 MG: 10 TABLET, FILM COATED ORAL at 08:07

## 2023-06-13 RX ADMIN — LOSARTAN POTASSIUM 100 MG: 50 TABLET, FILM COATED ORAL at 08:06

## 2023-06-13 RX ADMIN — ENOXAPARIN SODIUM 40 MG: 100 INJECTION SUBCUTANEOUS at 08:07

## 2023-06-13 RX ADMIN — Medication 10 ML: at 08:07

## 2023-06-13 RX ADMIN — GADOBENATE DIMEGLUMINE 20 ML: 529 INJECTION, SOLUTION INTRAVENOUS at 21:36

## 2023-06-13 RX ADMIN — HYDROCHLOROTHIAZIDE 25 MG: 25 TABLET ORAL at 08:07

## 2023-06-13 RX ADMIN — Medication 5 MG: at 22:50

## 2023-06-13 NOTE — PLAN OF CARE
Problem: Chronic Conditions and Co-morbidities  Goal: Patient's chronic conditions and co-morbidity symptoms are monitored and maintained or improved  Outcome: Progressing     Problem: Safety - Adult  Goal: Free from fall injury  6/13/2023 0831 by Shasha Reis RN  Outcome: Progressing  6/12/2023 2228 by Paige Jackson RN  Outcome: Progressing     Problem: ABCDS Injury Assessment  Goal: Absence of physical injury  Outcome: Progressing

## 2023-06-13 NOTE — PATIENT CARE CONFERENCE
[facility] Quality Flow/Interdisciplinary Rounds Progress Note        Quality Flow Rounds held on June 13, 2023    Disciplines Attending:  Bedside Nurse, , , and Nursing Unit Leadership    Jose Luis Lamb was admitted on 6/11/2023  6:07 PM    Anticipated Discharge Date:  Expected Discharge Date: 06/14/23    Disposition:    Duncan Score:  Duncan Scale Score: 20    Readmission Risk              Risk of Unplanned Readmission:  0           Discussed patient goal for the day, patient clinical progression, and barriers to discharge.   The following Goal(s) of the Day/Commitment(s) have been identified:  Discharge - Obtain Order and Labs - Report Results      Sue Gallego RN  June 13, 2023

## 2023-06-13 NOTE — CARE COORDINATION
Transition of care update. MRI of the brain pending. Neurology is currently awaiting MRI results before further recommendations. Echo completed with EF 60% per internal medicine note. Aspirin is on hold for now as patient has prostate surgery planned for Friday. Per internal medicine note, will await MRI results on decision if aspirin should be continued. His plan is to discharge home with wife transporting home when medically stable. CM/ELI will follow.   Electronically signed by William Padron RN on 6/13/2023 at 2:38 PM

## 2023-06-13 NOTE — PLAN OF CARE
Plan of care    Patient currently eating breakfast when I stopped in to see him. Still awaiting MRI brain. Neurology will follow up once MRI brain is completed. Advised pt and he notes the gabapentin is working well for his headaches and dizziness.

## 2023-06-13 NOTE — PROGRESS NOTES
Hospitalist Progress Note      Synopsis:   Briefly, patient with PMH significant for CAD, DM, HTN, gastric ulcer, heart murmur, HPL, chronic kidney disease, sleep apnea, thyroid disease presented to the ED with complaints of dizziness. Patient states she has had complaint for 1 year, states that it began after fall from steps where he hit his head on driveway, though it has gotten worse over the last week. Hospital course thus far unremarkable, CT of head showed no acute intercranial abnormality though chronic parenchymal atrophy, old white matter ischemia and stable ventriculomegaly was appreciated. Orthostatic blood pressures were negative. Neurology was consulted and MRI of brain was ordered. Hospital day 0     Subjective:  Stable overnight. No issues reported. Patient seen and examined  Records reviewed. Temp (24hrs), Av.1 °F (36.7 °C), Min:97.7 °F (36.5 °C), Max:98.7 °F (37.1 °C)    DIET: ADULT DIET; Regular  CODE: Full Code    Intake/Output Summary (Last 24 hours) at 2023 1238  Last data filed at 2023 1035  Gross per 24 hour   Intake 180 ml   Output 1 ml   Net 179 ml         Objective:    BP (!) 140/78   Pulse 61   Temp 98 °F (36.7 °C) (Oral)   Resp 16   Ht 5' 11\" (1.803 m)   Wt 229 lb 4.5 oz (104 kg)   SpO2 97%   PF (!) 16 L/min   BMI 31.98 kg/m²     General appearance: No apparent distress, appears stated age and cooperative. HEENT: Conjunctivae/corneas clear. Mucous membranes moist.  Neck: Supple. No JVD. Respiratory:  Clear to auscultation bilaterally. Normal respiratory effort. Cardiovascular:  RRR. S1, S2 without MRG. PV: Pulses palpable. No edema. Abdomen: Soft, non-tender, non-distended. +BS  Musculoskeletal: No obvious deformities. Skin: Normal skin color. No rashes or lesions. Good turgor. Neurologic:  Grossly non-focal. Awake, alert, following commands.    Psychiatric: Alert and oriented, thought content appropriate, normal insight and

## 2023-06-14 VITALS
DIASTOLIC BLOOD PRESSURE: 68 MMHG | OXYGEN SATURATION: 97 % | RESPIRATION RATE: 18 BRPM | BODY MASS INDEX: 32.1 KG/M2 | SYSTOLIC BLOOD PRESSURE: 144 MMHG | WEIGHT: 229.28 LBS | HEART RATE: 64 BPM | HEIGHT: 71 IN | TEMPERATURE: 97.9 F

## 2023-06-14 LAB
ERYTHROCYTE [DISTWIDTH] IN BLOOD BY AUTOMATED COUNT: 12.1 FL (ref 11.5–15)
HCT VFR BLD AUTO: 39.9 % (ref 37–54)
HGB BLD-MCNC: 13.6 G/DL (ref 12.5–16.5)
MCH RBC QN AUTO: 31.3 PG (ref 26–35)
MCHC RBC AUTO-ENTMCNC: 34.1 % (ref 32–34.5)
MCV RBC AUTO: 91.7 FL (ref 80–99.9)
METER GLUCOSE: 121 MG/DL (ref 74–99)
PLATELET # BLD AUTO: 228 E9/L (ref 130–450)
PMV BLD AUTO: 10.2 FL (ref 7–12)
RBC # BLD AUTO: 4.35 E12/L (ref 3.8–5.8)
WBC # BLD: 13.1 E9/L (ref 4.5–11.5)

## 2023-06-14 PROCEDURE — 82962 GLUCOSE BLOOD TEST: CPT

## 2023-06-14 PROCEDURE — 96372 THER/PROPH/DIAG INJ SC/IM: CPT

## 2023-06-14 PROCEDURE — 2580000003 HC RX 258: Performed by: INTERNAL MEDICINE

## 2023-06-14 PROCEDURE — 6360000002 HC RX W HCPCS: Performed by: INTERNAL MEDICINE

## 2023-06-14 PROCEDURE — 85027 COMPLETE CBC AUTOMATED: CPT

## 2023-06-14 PROCEDURE — 99232 SBSQ HOSP IP/OBS MODERATE 35: CPT | Performed by: NURSE PRACTITIONER

## 2023-06-14 PROCEDURE — 6370000000 HC RX 637 (ALT 250 FOR IP): Performed by: INTERNAL MEDICINE

## 2023-06-14 PROCEDURE — G0378 HOSPITAL OBSERVATION PER HR: HCPCS

## 2023-06-14 PROCEDURE — 36415 COLL VENOUS BLD VENIPUNCTURE: CPT

## 2023-06-14 RX ADMIN — ATORVASTATIN CALCIUM 10 MG: 10 TABLET, FILM COATED ORAL at 08:06

## 2023-06-14 RX ADMIN — LOSARTAN POTASSIUM 100 MG: 50 TABLET, FILM COATED ORAL at 08:06

## 2023-06-14 RX ADMIN — ENOXAPARIN SODIUM 40 MG: 100 INJECTION SUBCUTANEOUS at 08:06

## 2023-06-14 RX ADMIN — AMLODIPINE BESYLATE 10 MG: 10 TABLET ORAL at 08:06

## 2023-06-14 RX ADMIN — LEVOTHYROXINE SODIUM 88 MCG: 0.09 TABLET ORAL at 05:22

## 2023-06-14 RX ADMIN — BUPROPION HYDROCHLORIDE 150 MG: 150 TABLET, EXTENDED RELEASE ORAL at 08:06

## 2023-06-14 RX ADMIN — HYDROCHLOROTHIAZIDE 25 MG: 25 TABLET ORAL at 08:06

## 2023-06-14 RX ADMIN — Medication 10 ML: at 08:07

## 2023-06-14 NOTE — PROGRESS NOTES
Patient requesting to speak with a doctor prior to discharge. Dr. Myrna Goldman notified via perfect serve.

## 2023-06-14 NOTE — PROGRESS NOTES
Hospitalist Progress Note      Synopsis:   Briefly, patient with PMH significant for CAD, DM, HTN, gastric ulcer, heart murmur, HPL, chronic kidney disease, sleep apnea, thyroid disease presented to the ED with complaints of dizziness. Patient states she has had complaint for 1 year, states that it began after fall from steps where he hit his head on driveway, though it has gotten worse over the last week. Hospital course thus far unremarkable, CT of head showed no acute intercranial abnormality though chronic parenchymal atrophy, old white matter ischemia and stable ventriculomegaly was appreciated. Orthostatic blood pressures were negative. Neurology was consulted and MRI of brain was ordered. MRI of brain with no evidence of acute infarction or acute ischemic process. However mild to moderate cerebral cortical atrophy and moderate central atrophy with monitor chronic microvascular ischemic/aging changes in the brain parenchyma were appreciated. Patient can likely discharge to home once cleared by neurology. Hospital day 0     Subjective:  Patient seen and examined at bedside, states believes Celexa and gabapentin to be reasons of why he gets dizzy. Stable overnight. No issues reported. Patient seen and examined  Records reviewed. Temp (24hrs), Av.1 °F (36.7 °C), Min:97.8 °F (36.6 °C), Max:98.6 °F (37 °C)    DIET: ADULT DIET; Regular  CODE: Full Code    Intake/Output Summary (Last 24 hours) at 2023 1045  Last data filed at 2023 1035  Gross per 24 hour   Intake 720 ml   Output 150 ml   Net 570 ml         Objective:    BP (!) 154/70   Pulse 64   Temp 97.9 °F (36.6 °C)   Resp 18   Ht 5' 11\" (1.803 m)   Wt 229 lb 4.5 oz (104 kg)   SpO2 93%   PF (!) 16 L/min   BMI 31.98 kg/m²     General appearance: No apparent distress, appears stated age and cooperative. HEENT: Conjunctivae/corneas clear. Mucous membranes moist.  Neck: Supple. No JVD.   Respiratory:  Clear to auscultation

## 2023-06-14 NOTE — PROGRESS NOTES
[facility]  Neurology Follow up    Date:  6/14/2023  Patient Name:  Darwin Tompkins  YOB: 1945  MRN: [id number]     Chief Complaint: Dizziness and headaches x 1 year    History obtained from: Patient and chart     Assessment  Headaches and dizziness ongoing for over a year with acute worsening of symptoms   ---CT head remains stable compared to prior in 6/2022 showing parenchymal atrophy and white matter changes. --- symptoms likely multifactorial given ongoing presentation and multitude of co-morbidities. --- differentials relating to vasovagal dizziness vs intracranial hypotension vs cardiac related causes. --- gabapentin has helped his headaches    Plan  Hold anticholinergic medication (oxybutynin and donepezil) as could be worsening dizziness   Continue holding home ASA as of now for upcoming urological procedure on 6/16  PT/OT  Neurology to sign off at this time       History of Present Illness:  Darwin Tompkins is a 77 y.o. right handed male with past medical history of CAD, CABG with stents, hypertension, hyperlipidemia, dementia (on donepezil), depression, anxiety, hypothyroidism and CKD who presented to the ED for acute worsening of chronic headache and dizziness. Patient states that ever since his fall in 5/2022 he has had chronic bilateral frontal headaches as well as dizziness. Denies any falls since then. Work-up in the ED revealed CT head with stable ventriculomegaly follows chronic parenchymal atrophy and white matter changes when compared to prior head CT in 6/2022. Was recently evaluated by neurology at [facility] 5/17/2023. Diagnosed with posttraumatic headache, started on gabapentin 100 mg nightly. States this medication has helped improve his headaches some, but recently had acute worsening of symptoms prompting him to go to the ED. Patient lives at home with his wife.   Has become weaker over time and is more afraid to do forms of physical

## 2023-06-14 NOTE — DISCHARGE SUMMARY
Hospitalist Discharge Summary    Patient ID: Darwinzion Crandalli   Patient : 1945  Patient's PCP: Ozzie Carmona DO    Admit Date: 2023   Admitting Physician: Jennifer Wilson MD    Discharge Date:  2023   Discharge Physician: CATHY Pierce - CNP   Discharge Condition: Stable  Discharge Disposition: [facility] course in brief:  (Please refer to daily progress notes for a comprehensive review of the hospitalization by requesting medical records)  Briefly, patient with PMH significant for CAD, DM, HTN, gastric ulcer, heart murmur, HPL, chronic kidney disease, sleep apnea, thyroid disease presented to the ED with complaints of dizziness. Patient states she has had complaint for 1 year, states that it began after fall from steps where he hit his head on driveway, though it has gotten worse over the last week. Hospital course thus far unremarkable, CT of head showed no acute intercranial abnormality though chronic parenchymal atrophy, old white matter ischemia and stable ventriculomegaly was appreciated. Orthostatic blood pressures were negative. Neurology was consulted and MRI of brain was ordered. MRI of brain with no evidence of acute infarction or acute ischemic process. However mild to moderate cerebral cortical atrophy and moderate central atrophy with monitor chronic microvascular ischemic/aging changes in the brain parenchyma were appreciated. Was seen by neurology, Aricept was recommended to be stopped as it can cause dizziness. Patient stable for discharge from medical perspective. Consults:   IP CONSULT TO INTERNAL MEDICINE  IP CONSULT TO NEUROLOGY  IP CONSULT TO CASE MANAGEMENT    Discharge Diagnoses:  Dizziness  Difficulty ambulating  CAD  HTN  HPL  CKD  DM      Discharge Instructions / Follow up:    No future appointments. The patient's condition is stable.   At this time the patient is without objective evidence of an acute process requiring continuing

## 2023-06-14 NOTE — DISCHARGE INSTR - DIET

## 2023-06-14 NOTE — PATIENT CARE CONFERENCE
[facility] Quality Flow/Interdisciplinary Rounds Progress Note        Quality Flow Rounds held on June 14, 2023    Disciplines Attending:  Bedside Nurse, , , and Nursing Unit Leadership    Casa Solis was admitted on 6/11/2023  6:07 PM    Anticipated Discharge Date:  Expected Discharge Date: 06/14/23    Disposition:    Duncan Score:  Duncan Scale Score: 22    Readmission Risk              Risk of Unplanned Readmission:  0           Discussed patient goal for the day, patient clinical progression, and barriers to discharge.   The following Goal(s) of the Day/Commitment(s) have been identified:  Diagnostics - Report Results and Labs - Report Results      [place] RN  June 14, 2023

## 2023-06-14 NOTE — HOME CARE
[facility] received referral. Will follow after discharge. Call placed to patient yesterday with voice message left to call intake to verify demo's. Meghan Miller LPN, [address]      RECEIVED RETURN CALL FROM PATIENT TO [facility] ONCALL:    PT RETURNED TAYLOR'S CALL. PT STATES HE IS IN HIS HOSPITAL ROOM WAITING TO GO FOR MRI. THIS RN EXPLAINED TO PT THAT INTAKE HAD CALLED HIM TO GET INFO FOR HOME CARE SERVICES. PT STATES HE DOES NOT WANT HOME CARE SERVICES. THIS RN REVIEWED WITH PT THAT HE DOES NOT NEED OR WANT NURSING OR THERAPY SERVICES AT HOME, AND PT STATES THAT IS CORRECT.    PT'S CELL NUMBER -[id number]

## 2023-06-15 ENCOUNTER — PREP FOR PROCEDURE (OUTPATIENT)
Dept: UROLOGY | Age: 78
End: 2023-06-15

## 2023-06-15 RX ORDER — SODIUM CHLORIDE 0.9 % (FLUSH) 0.9 %
5-40 SYRINGE (ML) INJECTION EVERY 12 HOURS SCHEDULED
Status: CANCELLED | OUTPATIENT
Start: 2023-06-15

## 2023-06-15 RX ORDER — SODIUM CHLORIDE 9 MG/ML
INJECTION, SOLUTION INTRAVENOUS PRN
Status: CANCELLED | OUTPATIENT
Start: 2023-06-15

## 2023-06-15 RX ORDER — SODIUM CHLORIDE 0.9 % (FLUSH) 0.9 %
5-40 SYRINGE (ML) INJECTION PRN
Status: CANCELLED | OUTPATIENT
Start: 2023-06-15

## 2023-06-15 RX ORDER — VITAMIN B COMPLEX
TABLET ORAL DAILY
COMMUNITY

## 2023-06-16 ENCOUNTER — HOSPITAL ENCOUNTER (OUTPATIENT)
Age: 78
Setting detail: OUTPATIENT SURGERY
Discharge: HOME | DRG: 699 | End: 2023-06-16
Attending: UROLOGY | Admitting: UROLOGY
Payer: MEDICARE

## 2023-06-16 ENCOUNTER — HOSPITAL ENCOUNTER (INPATIENT)
Age: 78
LOS: 1 days | Discharge: HOME | DRG: 699 | End: 2023-06-18
Attending: EMERGENCY MEDICINE | Admitting: STUDENT IN AN ORGANIZED HEALTH CARE EDUCATION/TRAINING PROGRAM
Payer: MEDICARE

## 2023-06-16 VITALS
DIASTOLIC BLOOD PRESSURE: 78 MMHG | WEIGHT: 225 LBS | OXYGEN SATURATION: 99 % | TEMPERATURE: 97.3 F | HEART RATE: 75 BPM | HEIGHT: 71 IN | RESPIRATION RATE: 18 BRPM | BODY MASS INDEX: 31.5 KG/M2 | SYSTOLIC BLOOD PRESSURE: 166 MMHG

## 2023-06-16 DIAGNOSIS — E87.6 HYPOKALEMIA: ICD-10-CM

## 2023-06-16 DIAGNOSIS — N30.01 ACUTE CYSTITIS WITH HEMATURIA: Primary | ICD-10-CM

## 2023-06-16 LAB
ANION GAP SERPL CALCULATED.3IONS-SCNC: 14 MMOL/L (ref 7–16)
BACTERIA URNS QL MICRO: ABNORMAL /HPF
BASOPHILS # BLD: 0.03 E9/L (ref 0–0.2)
BASOPHILS NFR BLD: 0.2 % (ref 0–2)
BILIRUB UR QL STRIP: NEGATIVE
BUN SERPL-MCNC: 23 MG/DL (ref 6–23)
CALCIUM SERPL-MCNC: 9.1 MG/DL (ref 8.6–10.2)
CHLORIDE SERPL-SCNC: 107 MMOL/L (ref 98–107)
CLARITY UR: ABNORMAL
CO2 SERPL-SCNC: 22 MMOL/L (ref 22–29)
COLOR UR: ABNORMAL
CREAT SERPL-MCNC: 0.8 MG/DL (ref 0.7–1.2)
EOSINOPHIL # BLD: 0 E9/L (ref 0.05–0.5)
EOSINOPHIL NFR BLD: 0 % (ref 0–6)
EPI CELLS #/AREA URNS HPF: ABNORMAL /HPF
ERYTHROCYTE [DISTWIDTH] IN BLOOD BY AUTOMATED COUNT: 12.2 FL (ref 11.5–15)
GLUCOSE SERPL-MCNC: 188 MG/DL (ref 74–99)
GLUCOSE UR STRIP-MCNC: NEGATIVE MG/DL
HCT VFR BLD AUTO: 36.7 % (ref 37–54)
HGB BLD-MCNC: 12.7 G/DL (ref 12.5–16.5)
HGB UR QL STRIP: ABNORMAL
IMM GRANULOCYTES # BLD: 0.16 E9/L
IMM GRANULOCYTES NFR BLD: 0.8 % (ref 0–5)
KETONES UR STRIP-MCNC: ABNORMAL MG/DL
LEUKOCYTE ESTERASE UR QL STRIP: ABNORMAL
LYMPHOCYTES # BLD: 0.91 E9/L (ref 1.5–4)
LYMPHOCYTES NFR BLD: 4.6 % (ref 20–42)
MCH RBC QN AUTO: 31.4 PG (ref 26–35)
MCHC RBC AUTO-ENTMCNC: 34.6 % (ref 32–34.5)
MCV RBC AUTO: 90.8 FL (ref 80–99.9)
MONOCYTES # BLD: 1.11 E9/L (ref 0.1–0.95)
MONOCYTES NFR BLD: 5.6 % (ref 2–12)
NEUTROPHILS # BLD: 17.59 E9/L (ref 1.8–7.3)
NEUTS SEG NFR BLD: 88.8 % (ref 43–80)
NITRITE UR QL STRIP: POSITIVE
PH UR STRIP: 5 [PH] (ref 5–9)
PLATELET # BLD AUTO: 220 E9/L (ref 130–450)
PMV BLD AUTO: 10.5 FL (ref 7–12)
POTASSIUM SERPL-SCNC: 3.1 MMOL/L (ref 3.5–5)
PROT UR STRIP-MCNC: 100 MG/DL
RBC # BLD AUTO: 4.04 E12/L (ref 3.8–5.8)
RBC #/AREA URNS HPF: >20 /HPF (ref 0–2)
SODIUM SERPL-SCNC: 143 MMOL/L (ref 132–146)
SP GR UR STRIP: >=1.03 (ref 1–1.03)
UROBILINOGEN UR STRIP-ACNC: 2 E.U./DL
WBC # BLD: 19.8 E9/L (ref 4.5–11.5)
WBC #/AREA URNS HPF: ABNORMAL /HPF (ref 0–5)

## 2023-06-16 PROCEDURE — 3700000000 HC ANESTHESIA ATTENDED CARE: Performed by: UROLOGY

## 2023-06-16 PROCEDURE — 3600000012 HC SURGERY LEVEL 2 ADDTL 15MIN: Performed by: UROLOGY

## 2023-06-16 PROCEDURE — 7100000011 HC PHASE II RECOVERY - ADDTL 15 MIN: Performed by: UROLOGY

## 2023-06-16 PROCEDURE — 87040 BLOOD CULTURE FOR BACTERIA: CPT

## 2023-06-16 PROCEDURE — 0WHR8YZ INSERTION OF OTHER DEVICE INTO GENITOURINARY TRACT, VIA NATURAL OR ARTIFICIAL OPENING ENDOSCOPIC: ICD-10-PCS | Performed by: UROLOGY

## 2023-06-16 PROCEDURE — 2709999900 HC NON-CHARGEABLE SUPPLY: Performed by: UROLOGY

## 2023-06-16 PROCEDURE — 80048 BASIC METABOLIC PNL TOTAL CA: CPT

## 2023-06-16 PROCEDURE — 7100000000 HC PACU RECOVERY - FIRST 15 MIN: Performed by: UROLOGY

## 2023-06-16 PROCEDURE — 3700000001 HC ADD 15 MINUTES (ANESTHESIA): Performed by: UROLOGY

## 2023-06-16 PROCEDURE — 99285 EMERGENCY DEPT VISIT HI MDM: CPT

## 2023-06-16 PROCEDURE — 87088 URINE BACTERIA CULTURE: CPT

## 2023-06-16 PROCEDURE — 6370000000 HC RX 637 (ALT 250 FOR IP): Performed by: UROLOGY

## 2023-06-16 PROCEDURE — 81001 URINALYSIS AUTO W/SCOPE: CPT

## 2023-06-16 PROCEDURE — 7100000001 HC PACU RECOVERY - ADDTL 15 MIN: Performed by: UROLOGY

## 2023-06-16 PROCEDURE — 6360000002 HC RX W HCPCS

## 2023-06-16 PROCEDURE — 85025 COMPLETE CBC W/AUTO DIFF WBC: CPT

## 2023-06-16 PROCEDURE — C1889 IMPLANT/INSERT DEVICE, NOC: HCPCS | Performed by: UROLOGY

## 2023-06-16 PROCEDURE — 3600000002 HC SURGERY LEVEL 2 BASE: Performed by: UROLOGY

## 2023-06-16 PROCEDURE — 7100000010 HC PHASE II RECOVERY - FIRST 15 MIN: Performed by: UROLOGY

## 2023-06-16 DEVICE — SYSTEM UROLIFT2 W/ IMPL DEL DEV FOR TREAT OF URIN OUTFLO: Type: IMPLANTABLE DEVICE | Site: PROSTATE | Status: FUNCTIONAL

## 2023-06-16 DEVICE — DEVICE IMPLANT UROLIFT2 FOR TREAT OF URIN OUTFLO: Type: IMPLANTABLE DEVICE | Site: PROSTATE | Status: FUNCTIONAL

## 2023-06-16 RX ORDER — ONDANSETRON 2 MG/ML
INJECTION INTRAMUSCULAR; INTRAVENOUS
Status: COMPLETED
Start: 2023-06-16 | End: 2023-06-16

## 2023-06-16 RX ORDER — SODIUM CHLORIDE 0.9 % (FLUSH) 0.9 %
5-40 SYRINGE (ML) INJECTION EVERY 12 HOURS SCHEDULED
Status: DISCONTINUED | OUTPATIENT
Start: 2023-06-16 | End: 2023-06-16 | Stop reason: HOSPADM

## 2023-06-16 RX ORDER — PHENAZOPYRIDINE HYDROCHLORIDE 100 MG/1
200 TABLET, FILM COATED ORAL ONCE
Status: COMPLETED | OUTPATIENT
Start: 2023-06-16 | End: 2023-06-16

## 2023-06-16 RX ORDER — CEPHALEXIN 250 MG/1
250 CAPSULE ORAL 3 TIMES DAILY
Qty: 9 CAPSULE | Refills: 0 | Status: ON HOLD | OUTPATIENT
Start: 2023-06-16 | End: 2023-06-18 | Stop reason: HOSPADM

## 2023-06-16 RX ORDER — SODIUM CHLORIDE 0.9 % (FLUSH) 0.9 %
5-40 SYRINGE (ML) INJECTION PRN
Status: DISCONTINUED | OUTPATIENT
Start: 2023-06-16 | End: 2023-06-16 | Stop reason: HOSPADM

## 2023-06-16 RX ORDER — PHENAZOPYRIDINE HYDROCHLORIDE 100 MG/1
100 TABLET, FILM COATED ORAL 3 TIMES DAILY PRN
Qty: 21 TABLET | Refills: 0 | Status: SHIPPED | OUTPATIENT
Start: 2023-06-16 | End: 2023-06-23

## 2023-06-16 RX ORDER — LIDOCAINE HYDROCHLORIDE 20 MG/ML
JELLY TOPICAL PRN
Status: DISCONTINUED | OUTPATIENT
Start: 2023-06-16 | End: 2023-06-16 | Stop reason: ALTCHOICE

## 2023-06-16 RX ORDER — ONDANSETRON 2 MG/ML
4 INJECTION INTRAMUSCULAR; INTRAVENOUS ONCE
Status: COMPLETED | OUTPATIENT
Start: 2023-06-16 | End: 2023-06-16

## 2023-06-16 RX ORDER — SODIUM CHLORIDE 9 MG/ML
INJECTION, SOLUTION INTRAVENOUS PRN
Status: DISCONTINUED | OUTPATIENT
Start: 2023-06-16 | End: 2023-06-16 | Stop reason: HOSPADM

## 2023-06-16 RX ADMIN — ONDANSETRON 4 MG: 2 INJECTION INTRAMUSCULAR; INTRAVENOUS at 13:30

## 2023-06-16 RX ADMIN — PHENAZOPYRIDINE 200 MG: 100 TABLET ORAL at 14:38

## 2023-06-16 ASSESSMENT — PAIN DESCRIPTION - LOCATION
LOCATION: OTHER (COMMENT)
LOCATION: PENIS
LOCATION: PENIS;PERINEUM

## 2023-06-16 ASSESSMENT — PAIN DESCRIPTION - DESCRIPTORS: DESCRIPTORS: DISCOMFORT;BURNING

## 2023-06-16 ASSESSMENT — PAIN DESCRIPTION - FREQUENCY
FREQUENCY: CONTINUOUS
FREQUENCY: INTERMITTENT

## 2023-06-16 ASSESSMENT — PAIN DESCRIPTION - ORIENTATION: ORIENTATION: INNER

## 2023-06-16 ASSESSMENT — PAIN SCALES - GENERAL
PAINLEVEL_OUTOF10: 10
PAINLEVEL_OUTOF10: 6
PAINLEVEL_OUTOF10: 4

## 2023-06-16 ASSESSMENT — PAIN - FUNCTIONAL ASSESSMENT
PAIN_FUNCTIONAL_ASSESSMENT: 0-10
PAIN_FUNCTIONAL_ASSESSMENT: 0-10
PAIN_FUNCTIONAL_ASSESSMENT: ACTIVITIES ARE NOT PREVENTED

## 2023-06-16 ASSESSMENT — LIFESTYLE VARIABLES
HOW OFTEN DO YOU HAVE A DRINK CONTAINING ALCOHOL: MONTHLY OR LESS
HOW MANY STANDARD DRINKS CONTAINING ALCOHOL DO YOU HAVE ON A TYPICAL DAY: 1 OR 2

## 2023-06-16 ASSESSMENT — PAIN DESCRIPTION - PAIN TYPE
TYPE: SURGICAL PAIN
TYPE: SURGICAL PAIN

## 2023-06-16 ASSESSMENT — PAIN DESCRIPTION - ONSET: ONSET: ON-GOING

## 2023-06-16 NOTE — PROGRESS NOTES
Nourishment given tolerating without difficulty
Patient verbalizes that he wants to stay overnight. He has no medical reasoning that I can see to be admitted outside of expected post-op concerns. I have verbalized this to him and he continues to say he cannot go home. He was able to ambulate in the hallway with little to no assistance, is afebrile, has good draining from the myles, VSS, and was provided medication for his expected discomfort. On his request I have called and spoke to Eyad Seymour about admitting him overnight. Eyad Seymour states if he has no medical reason for admission I cannot admit him. He may call with any concerns he may have otherwise. He also states that they discussed this in pre-op and he was aware before the procedure of the outpatient nature if all goes as expected. Patient was educated about myles care and removal as instructed and prescriptions were sent to their choice of pharmacy. Patient family verbalizes understanding of these discharge instructions and has no further questions at this time.
Spoke with Patrizia at [facility]. Aware pt was recently in hospital for dizziness. MRI negative. Jesse Corley states Dr. Corrina Bray aware and OK to proceed with OR scheduled 6/16/23.
make up or hair products on the day of surgery    [x] You can expect a call the business day prior to procedure to notify you if your arrival time changes    [x] If you receive a survey after surgery we would greatly appreciate your comments    [] Parent/guardian of a minor must accompany their child and remain on the premises  the entire time they are under our care     [] Pediatric patients may bring favorite toy, blanket or comfort item with them    [] A caregiver or family member must remain with the patient during their stay if they are mentally handicapped, have dementia, disoriented or unable to use a call light or would be a safety concern if left unattended    [x] Please notify surgeon if you develop any illness between now and time of surgery (cold, cough, sore throat, fever, nausea, vomiting) or any signs of infections  including skin, wounds, and dental.    [x]  The Outpatient Pharmacy is available to fill your prescription here on your day of surgery, ask your preop nurse for details    [] Other instructions    EDUCATIONAL MATERIALS PROVIDED:    [] PAT Preoperative Education Packet/Booklet     [] Medication List    [] Transfusion bracelet applied with instructions    [] Shower with soap, lather and rinse well, and use CHG wipes provided the evening before surgery as instructed    [] Incentive spirometer with instructions

## 2023-06-16 NOTE — H&P
Safia Romero is a 77 y.o. male with BPH. Past Medical History:   Diagnosis Date    Anxiety and depression     CAD (coronary artery disease)     PTCA and stents    Depression     Erectile dysfunction     Essential hypertension 05/23/2016    Gastric ulcer     multiple    H/O scarlet fever     Heart murmur     in childhood, scarlet fever    Hyperlipidemia     Hypertension     Hypogonadism male     Psychiatric disorder     Renal disease     S/P sclerotherapy of varicose veins     both legs    Sleep apnea     in past    Thyroid disease     hypothyroidism       Past Surgical History:   Procedure Laterality Date    APPENDECTOMY      CARDIAC CATHETERIZATION  7/22/2013    [facility]    CARDIAC SURGERY      stents    CATARACT REMOVAL WITH IMPLANT Bilateral     left 12/16 & right 1/17    CHOLECYSTECTOMY      CIRCUMCISION      CORONARY ANGIOPLASTY  01/24/97    Stent LAD    CORONARY ANGIOPLASTY  03/14/11    instent restenosis of previously placed stents    CORONARY ARTERY BYPASS GRAFT  8/6/13    CYSTOURETHROSCOPY/URETHRAL DILATION      DIAGNOSTIC CARDIAC CATH LAB PROCEDURE  1987    [facility]    DIAGNOSTIC CARDIAC CATH LAB PROCEDURE  01/23/97    [facility]    DIAGNOSTIC CARDIAC CATH LAB PROCEDURE  11/25/97    [facility]    DIAGNOSTIC CARDIAC CATH LAB PROCEDURE  02/18/10    [facility], stent/proximal LAD    DIAGNOSTIC CARDIAC CATH LAB PROCEDURE  03/14/11    ALEX mid LAD        Family History   Problem Relation Age of Onset    Diabetes Sister     Hypertension Sister     Elevated Lipids Sister     Coronary Art Dis Sister         s/p CABG       Prior to Admission medications    Medication Sig Start Date End Date Taking? Authorizing Provider   Coenzyme Q10 (COQ10) 100 MG CAPS Take by mouth daily   Yes Historical Provider, MD   aspirin EC 81 MG EC tablet Take 1 tablet by mouth daily Resume taking post urology procedure 6/14/23   CATHY Regalado CNP   gabapentin (NEURONTIN) 100 MG capsule Take 1 capsule by mouth nightly.   Patient not taking:

## 2023-06-16 NOTE — DISCHARGE INSTRUCTIONS
Discharge instructions for Dr. Geovanna Choe:   -Cornell Rios will be groggy throughout the day due to the effects of anesthesia. Have a responsible adult monitor you for the next 24 hours.  -Call if fever or chills  -It is very normal to have burning and pain. If the pain is severe and out of your control contact Dr. Jt Cisneros. Diet: You may eat or drink whatever you like today. You may experience some nausea. Call if you have vomiting or inability to tolerate food or drink. Urine: Expect blood in your urine. This is normal.  This may be very traumatic in appearance but is not concerning unless there are large amount of clots that prevent you from being able to urinate. If you have any questions or concerns contact Dr. Migdalia Buenrostro office. Driving: You may not drive for 24 hours. You also may not drive if you are taking narcotic pain medications. Activity: There are no restrictions on your activity. Remove Riggs catheter Monday morning as directed.

## 2023-06-16 NOTE — OP NOTE
informed consent and agrees to proceed. OPERATIVE PROCEDURE:    Patient was brought to the operating room and placed under [facility] anesthesia in the dorsolithotomy position. Was prepped and draped in sterile fashion. A 20 Mohawk cystoscope was inserted into the bladder. The cystoscopy bridge was replaced with a UroLift delivery device. The prostate was inspected for treatment planning. The first treatment site was the patient's left side approximately 1.5 cm distal to the bladder neck. The distal tip of the delivery device was then angled laterally approximately 20 degrees at this position to compress the lateral lobe. The needle was then retracted allowing one end of the implant to be delivered to the capsular surface of the prostate. The implant was then tensioned to assure capsular seating and removal of slack monofilament. Urethral end piece was then affixed to the monofilament thereby tailoring the size of the implant. Excess filament was then severed. The same procedure was repeated on the right side 1.5 cm distal to the bladder neck. The prostate was then reevaluated and more implants were placed in the same fashion. A total of 3 implants were placed on the left and 3 on the right. No implants were delivered distal to the verumontanum to avoid the external striated sphincter. One device was attempted on the left that pulled through. A final cystoscopy was then performed and showed an open channel for urination. A Riggs catheter was inserted. The patient was awakened from anesthesia and taken recovery in stable condition. PLAN:    A total of 6 lifts were implanted (1 did not seat ) during this procedure. Patient will have his Riggs taken out tomorrow.   He will follow-up in the office in 1 month with a postvoid residual.      Electronically signed by Reta Aguilar MD on 6/16/2023 at 12:49 PM

## 2023-06-17 PROBLEM — N39.0 COMPLICATED UTI (URINARY TRACT INFECTION): Status: ACTIVE | Noted: 2023-06-17

## 2023-06-17 PROCEDURE — 2580000003 HC RX 258: Performed by: STUDENT IN AN ORGANIZED HEALTH CARE EDUCATION/TRAINING PROGRAM

## 2023-06-17 PROCEDURE — 96372 THER/PROPH/DIAG INJ SC/IM: CPT

## 2023-06-17 PROCEDURE — 6370000000 HC RX 637 (ALT 250 FOR IP): Performed by: STUDENT IN AN ORGANIZED HEALTH CARE EDUCATION/TRAINING PROGRAM

## 2023-06-17 PROCEDURE — 6360000002 HC RX W HCPCS: Performed by: STUDENT IN AN ORGANIZED HEALTH CARE EDUCATION/TRAINING PROGRAM

## 2023-06-17 PROCEDURE — 6360000002 HC RX W HCPCS: Performed by: EMERGENCY MEDICINE

## 2023-06-17 PROCEDURE — 96375 TX/PRO/DX INJ NEW DRUG ADDON: CPT

## 2023-06-17 PROCEDURE — G0378 HOSPITAL OBSERVATION PER HR: HCPCS

## 2023-06-17 PROCEDURE — 1200000000 HC SEMI PRIVATE

## 2023-06-17 PROCEDURE — 99222 1ST HOSP IP/OBS MODERATE 55: CPT | Performed by: STUDENT IN AN ORGANIZED HEALTH CARE EDUCATION/TRAINING PROGRAM

## 2023-06-17 PROCEDURE — 96374 THER/PROPH/DIAG INJ IV PUSH: CPT

## 2023-06-17 RX ORDER — HYDROCHLOROTHIAZIDE 25 MG/1
25 TABLET ORAL DAILY
Status: DISCONTINUED | OUTPATIENT
Start: 2023-06-17 | End: 2023-06-18

## 2023-06-17 RX ORDER — POLYETHYLENE GLYCOL 3350 17 G/17G
17 POWDER, FOR SOLUTION ORAL DAILY PRN
Status: DISCONTINUED | OUTPATIENT
Start: 2023-06-17 | End: 2023-06-18 | Stop reason: HOSPADM

## 2023-06-17 RX ORDER — AMLODIPINE BESYLATE 10 MG/1
10 TABLET ORAL DAILY
Status: DISCONTINUED | OUTPATIENT
Start: 2023-06-17 | End: 2023-06-18 | Stop reason: HOSPADM

## 2023-06-17 RX ORDER — POTASSIUM CHLORIDE 20 MEQ/1
40 TABLET, EXTENDED RELEASE ORAL ONCE
Status: DISCONTINUED | OUTPATIENT
Start: 2023-06-17 | End: 2023-06-17

## 2023-06-17 RX ORDER — POTASSIUM CHLORIDE 7.45 MG/ML
10 INJECTION INTRAVENOUS
Status: COMPLETED | OUTPATIENT
Start: 2023-06-17 | End: 2023-06-17

## 2023-06-17 RX ORDER — ALPRAZOLAM 0.25 MG/1
0.25 TABLET ORAL 2 TIMES DAILY PRN
Status: DISCONTINUED | OUTPATIENT
Start: 2023-06-17 | End: 2023-06-18 | Stop reason: HOSPADM

## 2023-06-17 RX ORDER — SODIUM CHLORIDE 0.9 % (FLUSH) 0.9 %
5-40 SYRINGE (ML) INJECTION EVERY 12 HOURS SCHEDULED
Status: DISCONTINUED | OUTPATIENT
Start: 2023-06-17 | End: 2023-06-18 | Stop reason: HOSPADM

## 2023-06-17 RX ORDER — POTASSIUM CHLORIDE 7.45 MG/ML
10 INJECTION INTRAVENOUS ONCE
Status: DISCONTINUED | OUTPATIENT
Start: 2023-06-17 | End: 2023-06-17 | Stop reason: SDUPTHER

## 2023-06-17 RX ORDER — LOSARTAN POTASSIUM 50 MG/1
100 TABLET ORAL DAILY
Status: DISCONTINUED | OUTPATIENT
Start: 2023-06-17 | End: 2023-06-18 | Stop reason: HOSPADM

## 2023-06-17 RX ORDER — ASPIRIN 81 MG/1
81 TABLET ORAL DAILY
COMMUNITY

## 2023-06-17 RX ORDER — ATORVASTATIN CALCIUM 10 MG/1
10 TABLET, FILM COATED ORAL DAILY
Status: DISCONTINUED | OUTPATIENT
Start: 2023-06-17 | End: 2023-06-18 | Stop reason: HOSPADM

## 2023-06-17 RX ORDER — ONDANSETRON 2 MG/ML
4 INJECTION INTRAMUSCULAR; INTRAVENOUS EVERY 6 HOURS PRN
Status: DISCONTINUED | OUTPATIENT
Start: 2023-06-17 | End: 2023-06-18 | Stop reason: HOSPADM

## 2023-06-17 RX ORDER — PHENAZOPYRIDINE HYDROCHLORIDE 100 MG/1
100 TABLET, FILM COATED ORAL 3 TIMES DAILY PRN
Status: DISCONTINUED | OUTPATIENT
Start: 2023-06-17 | End: 2023-06-18 | Stop reason: HOSPADM

## 2023-06-17 RX ORDER — PANTOPRAZOLE SODIUM 40 MG/1
40 TABLET, DELAYED RELEASE ORAL
Status: DISCONTINUED | OUTPATIENT
Start: 2023-06-17 | End: 2023-06-18 | Stop reason: HOSPADM

## 2023-06-17 RX ORDER — ACETAMINOPHEN 650 MG/1
650 SUPPOSITORY RECTAL EVERY 6 HOURS PRN
Status: DISCONTINUED | OUTPATIENT
Start: 2023-06-17 | End: 2023-06-18 | Stop reason: HOSPADM

## 2023-06-17 RX ORDER — SODIUM CHLORIDE 0.9 % (FLUSH) 0.9 %
5-40 SYRINGE (ML) INJECTION PRN
Status: DISCONTINUED | OUTPATIENT
Start: 2023-06-17 | End: 2023-06-18 | Stop reason: HOSPADM

## 2023-06-17 RX ORDER — CITALOPRAM 20 MG/1
40 TABLET ORAL NIGHTLY
Status: DISCONTINUED | OUTPATIENT
Start: 2023-06-17 | End: 2023-06-18 | Stop reason: HOSPADM

## 2023-06-17 RX ORDER — LABETALOL HYDROCHLORIDE 5 MG/ML
10 INJECTION, SOLUTION INTRAVENOUS EVERY 4 HOURS PRN
Status: DISCONTINUED | OUTPATIENT
Start: 2023-06-17 | End: 2023-06-18 | Stop reason: HOSPADM

## 2023-06-17 RX ORDER — DONEPEZIL HYDROCHLORIDE 10 MG/1
10 TABLET, FILM COATED ORAL NIGHTLY
COMMUNITY

## 2023-06-17 RX ORDER — TAMSULOSIN HYDROCHLORIDE 0.4 MG/1
0.4 CAPSULE ORAL DAILY
Status: DISCONTINUED | OUTPATIENT
Start: 2023-06-17 | End: 2023-06-17

## 2023-06-17 RX ORDER — LEVOTHYROXINE SODIUM 88 UG/1
88 TABLET ORAL DAILY
Status: DISCONTINUED | OUTPATIENT
Start: 2023-06-17 | End: 2023-06-18 | Stop reason: HOSPADM

## 2023-06-17 RX ORDER — ACETAMINOPHEN 325 MG/1
650 TABLET ORAL EVERY 6 HOURS PRN
Status: DISCONTINUED | OUTPATIENT
Start: 2023-06-17 | End: 2023-06-18 | Stop reason: HOSPADM

## 2023-06-17 RX ORDER — SODIUM CHLORIDE 9 MG/ML
INJECTION, SOLUTION INTRAVENOUS PRN
Status: DISCONTINUED | OUTPATIENT
Start: 2023-06-17 | End: 2023-06-18 | Stop reason: HOSPADM

## 2023-06-17 RX ORDER — BUPROPION HYDROCHLORIDE 150 MG/1
150 TABLET ORAL EVERY MORNING
Status: DISCONTINUED | OUTPATIENT
Start: 2023-06-17 | End: 2023-06-18 | Stop reason: HOSPADM

## 2023-06-17 RX ORDER — ONDANSETRON 4 MG/1
4 TABLET, ORALLY DISINTEGRATING ORAL EVERY 8 HOURS PRN
Status: DISCONTINUED | OUTPATIENT
Start: 2023-06-17 | End: 2023-06-18 | Stop reason: HOSPADM

## 2023-06-17 RX ORDER — ENOXAPARIN SODIUM 100 MG/ML
30 INJECTION SUBCUTANEOUS 2 TIMES DAILY
Status: DISCONTINUED | OUTPATIENT
Start: 2023-06-17 | End: 2023-06-18 | Stop reason: HOSPADM

## 2023-06-17 RX ADMIN — POLYETHYLENE GLYCOL 3350 17 G: 17 POWDER, FOR SOLUTION ORAL at 12:39

## 2023-06-17 RX ADMIN — ENOXAPARIN SODIUM 30 MG: 100 INJECTION SUBCUTANEOUS at 21:00

## 2023-06-17 RX ADMIN — ACETAMINOPHEN 650 MG: 325 TABLET ORAL at 10:07

## 2023-06-17 RX ADMIN — LOSARTAN POTASSIUM 100 MG: 50 TABLET, FILM COATED ORAL at 10:07

## 2023-06-17 RX ADMIN — AMLODIPINE BESYLATE 10 MG: 10 TABLET ORAL at 10:07

## 2023-06-17 RX ADMIN — LEVOTHYROXINE SODIUM 88 MCG: 0.09 TABLET ORAL at 06:18

## 2023-06-17 RX ADMIN — CEFTRIAXONE 1000 MG: 1 INJECTION, POWDER, FOR SOLUTION INTRAMUSCULAR; INTRAVENOUS at 00:43

## 2023-06-17 RX ADMIN — SODIUM CHLORIDE, PRESERVATIVE FREE 10 ML: 5 INJECTION INTRAVENOUS at 20:12

## 2023-06-17 RX ADMIN — ENOXAPARIN SODIUM 30 MG: 100 INJECTION SUBCUTANEOUS at 01:05

## 2023-06-17 RX ADMIN — POTASSIUM CHLORIDE 10 MEQ: 10 INJECTION, SOLUTION INTRAVENOUS at 02:29

## 2023-06-17 RX ADMIN — POTASSIUM CHLORIDE 10 MEQ: 10 INJECTION, SOLUTION INTRAVENOUS at 01:05

## 2023-06-17 RX ADMIN — PHENAZOPYRIDINE 100 MG: 100 TABLET ORAL at 12:39

## 2023-06-17 RX ADMIN — BUPROPION HYDROCHLORIDE 150 MG: 150 TABLET, EXTENDED RELEASE ORAL at 11:09

## 2023-06-17 RX ADMIN — ONDANSETRON 4 MG: 2 INJECTION INTRAMUSCULAR; INTRAVENOUS at 03:37

## 2023-06-17 RX ADMIN — HYDROCHLOROTHIAZIDE 25 MG: 25 TABLET ORAL at 10:07

## 2023-06-17 RX ADMIN — POTASSIUM CHLORIDE 10 MEQ: 10 INJECTION, SOLUTION INTRAVENOUS at 04:02

## 2023-06-17 RX ADMIN — PANTOPRAZOLE SODIUM 40 MG: 40 TABLET, DELAYED RELEASE ORAL at 06:18

## 2023-06-17 RX ADMIN — ACETAMINOPHEN 650 MG: 325 TABLET ORAL at 00:42

## 2023-06-17 RX ADMIN — ENOXAPARIN SODIUM 30 MG: 100 INJECTION SUBCUTANEOUS at 10:07

## 2023-06-17 RX ADMIN — CITALOPRAM HYDROBROMIDE 40 MG: 20 TABLET ORAL at 20:12

## 2023-06-17 RX ADMIN — ATORVASTATIN CALCIUM 10 MG: 10 TABLET, FILM COATED ORAL at 20:12

## 2023-06-17 RX ADMIN — ALPRAZOLAM 0.25 MG: 0.25 TABLET ORAL at 22:08

## 2023-06-17 RX ADMIN — SODIUM CHLORIDE, PRESERVATIVE FREE 10 ML: 5 INJECTION INTRAVENOUS at 10:08

## 2023-06-17 ASSESSMENT — PAIN DESCRIPTION - LOCATION: LOCATION: HEAD

## 2023-06-17 ASSESSMENT — PAIN SCALES - GENERAL: PAINLEVEL_OUTOF10: 10

## 2023-06-17 ASSESSMENT — PAIN DESCRIPTION - DESCRIPTORS: DESCRIPTORS: ACHING

## 2023-06-18 VITALS
TEMPERATURE: 98.5 F | HEIGHT: 71 IN | OXYGEN SATURATION: 92 % | DIASTOLIC BLOOD PRESSURE: 69 MMHG | RESPIRATION RATE: 18 BRPM | WEIGHT: 232 LBS | HEART RATE: 61 BPM | BODY MASS INDEX: 32.48 KG/M2 | SYSTOLIC BLOOD PRESSURE: 148 MMHG

## 2023-06-18 LAB
ALBUMIN SERPL-MCNC: 3.7 G/DL (ref 3.5–5.2)
ALP SERPL-CCNC: 71 U/L (ref 40–129)
ALT SERPL-CCNC: 44 U/L (ref 0–40)
ANION GAP SERPL CALCULATED.3IONS-SCNC: 10 MMOL/L (ref 7–16)
AST SERPL-CCNC: 61 U/L (ref 0–39)
BASOPHILS # BLD: 0.04 E9/L (ref 0–0.2)
BASOPHILS NFR BLD: 0.3 % (ref 0–2)
BILIRUB SERPL-MCNC: 1.5 MG/DL (ref 0–1.2)
BUN SERPL-MCNC: 23 MG/DL (ref 6–23)
CALCIUM SERPL-MCNC: 9.1 MG/DL (ref 8.6–10.2)
CHLORIDE SERPL-SCNC: 104 MMOL/L (ref 98–107)
CO2 SERPL-SCNC: 28 MMOL/L (ref 22–29)
CREAT SERPL-MCNC: 0.9 MG/DL (ref 0.7–1.2)
EOSINOPHIL # BLD: 0.08 E9/L (ref 0.05–0.5)
EOSINOPHIL NFR BLD: 0.6 % (ref 0–6)
ERYTHROCYTE [DISTWIDTH] IN BLOOD BY AUTOMATED COUNT: 12.4 FL (ref 11.5–15)
GLUCOSE SERPL-MCNC: 118 MG/DL (ref 74–99)
HCT VFR BLD AUTO: 37.6 % (ref 37–54)
HGB BLD-MCNC: 12.7 G/DL (ref 12.5–16.5)
IMM GRANULOCYTES # BLD: 0.11 E9/L
IMM GRANULOCYTES NFR BLD: 0.8 % (ref 0–5)
INR BLD: 1.2
LACTATE BLDV-SCNC: 1.1 MMOL/L (ref 0.5–2.2)
LYMPHOCYTES # BLD: 1.62 E9/L (ref 1.5–4)
LYMPHOCYTES NFR BLD: 11.4 % (ref 20–42)
MAGNESIUM SERPL-MCNC: 1.9 MG/DL (ref 1.6–2.6)
MCH RBC QN AUTO: 31.4 PG (ref 26–35)
MCHC RBC AUTO-ENTMCNC: 33.8 % (ref 32–34.5)
MCV RBC AUTO: 92.8 FL (ref 80–99.9)
MONOCYTES # BLD: 1.16 E9/L (ref 0.1–0.95)
MONOCYTES NFR BLD: 8.2 % (ref 2–12)
NEUTROPHILS # BLD: 11.21 E9/L (ref 1.8–7.3)
NEUTS SEG NFR BLD: 78.7 % (ref 43–80)
PHOSPHATE SERPL-MCNC: 2.9 MG/DL (ref 2.5–4.5)
PLATELET # BLD AUTO: 221 E9/L (ref 130–450)
PMV BLD AUTO: 9.9 FL (ref 7–12)
POTASSIUM SERPL-SCNC: 3.1 MMOL/L (ref 3.5–5)
PROCALCITONIN: 0.05 NG/ML (ref 0–0.08)
PROT SERPL-MCNC: 6.5 G/DL (ref 6.4–8.3)
PROTHROMBIN TIME: 13.5 SEC (ref 9.3–12.4)
RBC # BLD AUTO: 4.05 E12/L (ref 3.8–5.8)
SODIUM SERPL-SCNC: 142 MMOL/L (ref 132–146)
WBC # BLD: 14.2 E9/L (ref 4.5–11.5)

## 2023-06-18 PROCEDURE — 96372 THER/PROPH/DIAG INJ SC/IM: CPT

## 2023-06-18 PROCEDURE — 84145 PROCALCITONIN (PCT): CPT

## 2023-06-18 PROCEDURE — 85610 PROTHROMBIN TIME: CPT

## 2023-06-18 PROCEDURE — 6370000000 HC RX 637 (ALT 250 FOR IP): Performed by: STUDENT IN AN ORGANIZED HEALTH CARE EDUCATION/TRAINING PROGRAM

## 2023-06-18 PROCEDURE — 6360000002 HC RX W HCPCS: Performed by: STUDENT IN AN ORGANIZED HEALTH CARE EDUCATION/TRAINING PROGRAM

## 2023-06-18 PROCEDURE — 6370000000 HC RX 637 (ALT 250 FOR IP): Performed by: INTERNAL MEDICINE

## 2023-06-18 PROCEDURE — 84100 ASSAY OF PHOSPHORUS: CPT

## 2023-06-18 PROCEDURE — 36415 COLL VENOUS BLD VENIPUNCTURE: CPT

## 2023-06-18 PROCEDURE — 2580000003 HC RX 258: Performed by: STUDENT IN AN ORGANIZED HEALTH CARE EDUCATION/TRAINING PROGRAM

## 2023-06-18 PROCEDURE — 85025 COMPLETE CBC W/AUTO DIFF WBC: CPT

## 2023-06-18 PROCEDURE — G0378 HOSPITAL OBSERVATION PER HR: HCPCS

## 2023-06-18 PROCEDURE — 83605 ASSAY OF LACTIC ACID: CPT

## 2023-06-18 PROCEDURE — 99239 HOSP IP/OBS DSCHRG MGMT >30: CPT | Performed by: INTERNAL MEDICINE

## 2023-06-18 PROCEDURE — 83735 ASSAY OF MAGNESIUM: CPT

## 2023-06-18 PROCEDURE — 96376 TX/PRO/DX INJ SAME DRUG ADON: CPT

## 2023-06-18 PROCEDURE — 80053 COMPREHEN METABOLIC PANEL: CPT

## 2023-06-18 RX ORDER — LABETALOL 200 MG/1
200 TABLET, FILM COATED ORAL EVERY 8 HOURS SCHEDULED
Qty: 60 TABLET | Refills: 3 | Status: SHIPPED | OUTPATIENT
Start: 2023-06-18

## 2023-06-18 RX ORDER — LABETALOL 200 MG/1
200 TABLET, FILM COATED ORAL EVERY 8 HOURS SCHEDULED
Status: DISCONTINUED | OUTPATIENT
Start: 2023-06-18 | End: 2023-06-18 | Stop reason: HOSPADM

## 2023-06-18 RX ORDER — POTASSIUM CHLORIDE 20 MEQ/1
40 TABLET, EXTENDED RELEASE ORAL 2 TIMES DAILY
Status: DISCONTINUED | OUTPATIENT
Start: 2023-06-18 | End: 2023-06-18 | Stop reason: HOSPADM

## 2023-06-18 RX ORDER — LEVOFLOXACIN 750 MG/1
750 TABLET ORAL DAILY
Qty: 7 TABLET | Refills: 0 | Status: SHIPPED | OUTPATIENT
Start: 2023-06-18 | End: 2023-06-25

## 2023-06-18 RX ADMIN — LEVOTHYROXINE SODIUM 88 MCG: 0.09 TABLET ORAL at 05:53

## 2023-06-18 RX ADMIN — CEFTRIAXONE 1000 MG: 1 INJECTION, POWDER, FOR SOLUTION INTRAMUSCULAR; INTRAVENOUS at 00:15

## 2023-06-18 RX ADMIN — SODIUM CHLORIDE, PRESERVATIVE FREE 10 ML: 5 INJECTION INTRAVENOUS at 10:41

## 2023-06-18 RX ADMIN — ENOXAPARIN SODIUM 30 MG: 100 INJECTION SUBCUTANEOUS at 10:41

## 2023-06-18 RX ADMIN — LOSARTAN POTASSIUM 100 MG: 50 TABLET, FILM COATED ORAL at 10:41

## 2023-06-18 RX ADMIN — BUPROPION HYDROCHLORIDE 150 MG: 150 TABLET, EXTENDED RELEASE ORAL at 10:41

## 2023-06-18 RX ADMIN — POLYETHYLENE GLYCOL 3350 17 G: 17 POWDER, FOR SOLUTION ORAL at 03:00

## 2023-06-18 RX ADMIN — LABETALOL HYDROCHLORIDE 200 MG: 200 TABLET, FILM COATED ORAL at 15:34

## 2023-06-18 RX ADMIN — PANTOPRAZOLE SODIUM 40 MG: 40 TABLET, DELAYED RELEASE ORAL at 05:53

## 2023-06-18 RX ADMIN — AMLODIPINE BESYLATE 10 MG: 10 TABLET ORAL at 10:41

## 2023-06-18 RX ADMIN — POTASSIUM CHLORIDE 40 MEQ: 1500 TABLET, EXTENDED RELEASE ORAL at 10:45

## 2023-06-19 LAB — BACTERIA UR CULT: NORMAL

## 2023-06-22 LAB
BACTERIA BLD CULT ORG #2: NORMAL
BACTERIA BLD CULT: NORMAL

## 2023-07-18 ENCOUNTER — HOSPITAL ENCOUNTER (OUTPATIENT)
Age: 78
Discharge: HOME | End: 2023-07-20

## 2023-08-07 RX ORDER — AMLODIPINE BESYLATE 10 MG/1
TABLET ORAL
Qty: 90 TABLET | Refills: 3 | Status: SHIPPED | OUTPATIENT
Start: 2023-08-07

## 2023-09-08 ENCOUNTER — OFFICE VISIT (OUTPATIENT)
Dept: CARDIOLOGY CLINIC | Age: 78
End: 2023-09-08

## 2023-09-08 VITALS
BODY MASS INDEX: 31.08 KG/M2 | DIASTOLIC BLOOD PRESSURE: 58 MMHG | HEART RATE: 75 BPM | HEIGHT: 71 IN | WEIGHT: 222 LBS | SYSTOLIC BLOOD PRESSURE: 112 MMHG | RESPIRATION RATE: 18 BRPM

## 2023-09-08 DIAGNOSIS — G47.33 OSA ON CPAP: ICD-10-CM

## 2023-09-08 DIAGNOSIS — I25.10 CORONARY ARTERY DISEASE INVOLVING NATIVE CORONARY ARTERY OF NATIVE HEART WITHOUT ANGINA PECTORIS: ICD-10-CM

## 2023-09-08 DIAGNOSIS — Z86.79 HISTORY OF SINUS BRADYCARDIA: ICD-10-CM

## 2023-09-08 DIAGNOSIS — I10 ESSENTIAL HYPERTENSION: Primary | ICD-10-CM

## 2023-09-08 DIAGNOSIS — Z95.1 STATUS POST CORONARY ARTERY BYPASS GRAFT: ICD-10-CM

## 2023-09-08 DIAGNOSIS — Z99.89 OSA ON CPAP: ICD-10-CM

## 2023-09-08 RX ORDER — LANOLIN ALCOHOL/MO/W.PET/CERES
3 CREAM (GRAM) TOPICAL DAILY
COMMUNITY

## 2023-09-08 RX ORDER — HYDROCHLOROTHIAZIDE 25 MG/1
25 TABLET ORAL DAILY
COMMUNITY

## 2023-09-08 NOTE — PROGRESS NOTES
native coronary artery of native heart without angina pectoris - s/p PCI of LAD several times; s/p LIMA to LAD in 2013 - Continue ASA, BB, Statin   - EKG 12 Lead     Essential hypertension - Monitor BP; low salt diet, exercise, weight loss discussed    - EKG 12 Lead    S/P coronary artery bypass graft - off-pump CABG  x1, left internal mammary artery to the LAD on 8/6/2013. Dr Constance Velasquez     Aortic regurgitation - Mild, Echo June 2023    Non morbid obesity - Diet, exercise and weight loss discussed. Headaches - Advised to see Neurology     Hx of Edema feet - Elevate feet, decrease salt, He was referred to Vascular surgery - Dr Kenneth Hidalgo, [place]     Preventive Cardiology: Low cholesterol diet, regular exercise as tolerate, and gradual weight loss discussed. Above recommendations discussed with him and his family   Current medication list, allergies, problem list and results of prior tests (as available) were discussed today   All questions answered about cardiac diagnoses and cardiac medications. Continue current medications. Monitor BP and heart rates. Compliance with medications and f/u with all physicians discussed. Risk factor modification based on risk profile discussed. Call if any exertional chest pain, short of breath, dizzy or palpitations. Follow up in 6-9 months or earlier if needed.          [facility]  [address] [facility], [facility], [address] [place] [address]  [phone]

## 2024-02-26 LAB
25(OH)D3 SERPL-MCNC: 28.3 NG/ML (ref 30–100)
ALBUMIN SERPL-MCNC: 3.7 G/DL (ref 3.5–5.2)
ALP SERPL-CCNC: 83 U/L (ref 40–129)
ALT SERPL-CCNC: 59 U/L (ref 0–40)
ANION GAP SERPL CALCULATED.3IONS-SCNC: 14 MMOL/L (ref 7–16)
AST SERPL-CCNC: 44 U/L (ref 0–39)
BASOPHILS # BLD: 0 K/UL (ref 0–0.2)
BASOPHILS NFR BLD: 0 % (ref 0–2)
BILIRUB SERPL-MCNC: 1.3 MG/DL (ref 0–1.2)
BUN SERPL-MCNC: 23 MG/DL (ref 6–23)
CALCIUM SERPL-MCNC: 9.2 MG/DL (ref 8.6–10.2)
CHLORIDE SERPL-SCNC: 100 MMOL/L (ref 98–107)
CHOLEST SERPL-MCNC: 134 MG/DL
CO2 SERPL-SCNC: 25 MMOL/L (ref 22–29)
CREAT SERPL-MCNC: 0.9 MG/DL (ref 0.7–1.2)
EOSINOPHIL # BLD: 0.26 K/UL (ref 0.05–0.5)
EOSINOPHILS RELATIVE PERCENT: 2 % (ref 0–6)
ERYTHROCYTE [DISTWIDTH] IN BLOOD BY AUTOMATED COUNT: 11.9 % (ref 11.5–15)
GFR SERPL CREATININE-BSD FRML MDRD: >60 ML/MIN/1.73M2
GLUCOSE SERPL-MCNC: 99 MG/DL (ref 74–99)
HCT VFR BLD AUTO: 42.3 % (ref 37–54)
HDLC SERPL-MCNC: 36 MG/DL
HGB BLD-MCNC: 14.6 G/DL (ref 12.5–16.5)
LDLC SERPL CALC-MCNC: 78 MG/DL
LYMPHOCYTES NFR BLD: 1.85 K/UL (ref 1.5–4)
LYMPHOCYTES RELATIVE PERCENT: 12 % (ref 20–42)
MCH RBC QN AUTO: 32.2 PG (ref 26–35)
MCHC RBC AUTO-ENTMCNC: 34.5 G/DL (ref 32–34.5)
MCV RBC AUTO: 93.2 FL (ref 80–99.9)
METAMYELOCYTES ABSOLUTE COUNT: 0.26 K/UL (ref 0–0.12)
METAMYELOCYTES: 2 % (ref 0–1)
MONOCYTES NFR BLD: 1.85 K/UL (ref 0.1–0.95)
MONOCYTES NFR BLD: 12 % (ref 2–12)
MYELOCYTES ABSOLUTE COUNT: 0.26 K/UL
MYELOCYTES: 2 %
NEUTROPHILS NFR BLD: 70 % (ref 43–80)
NEUTS SEG NFR BLD: 10.71 K/UL (ref 1.8–7.3)
PLATELET # BLD AUTO: 268 K/UL (ref 130–450)
PMV BLD AUTO: 10.9 FL (ref 7–12)
POTASSIUM SERPL-SCNC: 3.6 MMOL/L (ref 3.5–5)
PROT SERPL-MCNC: 6.7 G/DL (ref 6.4–8.3)
RBC # BLD AUTO: 4.54 M/UL (ref 3.8–5.8)
RBC # BLD: NORMAL 10*6/UL
SODIUM SERPL-SCNC: 139 MMOL/L (ref 132–146)
TRIGL SERPL-MCNC: 100 MG/DL
TSH SERPL DL<=0.05 MIU/L-ACNC: 3.75 UIU/ML (ref 0.27–4.2)
VLDLC SERPL CALC-MCNC: 20 MG/DL
WBC OTHER # BLD: 15.2 K/UL (ref 4.5–11.5)

## 2024-03-27 ENCOUNTER — HOSPITAL ENCOUNTER (OUTPATIENT)
Dept: ULTRASOUND IMAGING | Age: 79
Discharge: HOME | End: 2024-03-29
Attending: UROLOGY
Payer: MEDICARE

## 2024-03-27 DIAGNOSIS — N39.41 URGE INCONTINENCE: ICD-10-CM

## 2024-03-27 DIAGNOSIS — N40.1 BENIGN PROSTATIC HYPERPLASIA WITH LOWER URINARY TRACT SYMPTOMS, SYMPTOM DETAILS UNSPECIFIED: ICD-10-CM

## 2024-03-27 PROCEDURE — 76857 US EXAM PELVIC LIMITED: CPT

## 2024-05-15 ENCOUNTER — PREP FOR PROCEDURE (OUTPATIENT)
Dept: UROLOGY | Age: 79
End: 2024-05-15

## 2024-05-15 RX ORDER — SODIUM CHLORIDE 9 MG/ML
INJECTION, SOLUTION INTRAVENOUS PRN
Status: CANCELLED | OUTPATIENT
Start: 2024-05-15

## 2024-05-15 RX ORDER — SODIUM CHLORIDE 0.9 % (FLUSH) 0.9 %
5-40 SYRINGE (ML) INJECTION EVERY 12 HOURS SCHEDULED
Status: CANCELLED | OUTPATIENT
Start: 2024-05-15

## 2024-05-15 RX ORDER — SODIUM CHLORIDE 0.9 % (FLUSH) 0.9 %
5-40 SYRINGE (ML) INJECTION PRN
Status: CANCELLED | OUTPATIENT
Start: 2024-05-15

## 2024-05-15 RX ORDER — SODIUM CHLORIDE 9 MG/ML
INJECTION, SOLUTION INTRAVENOUS CONTINUOUS
Status: CANCELLED | OUTPATIENT
Start: 2024-05-15

## 2024-05-15 NOTE — PROGRESS NOTES
DR. AYALA NOTIFIED OF CARDIAC HISTORY AND LAST OFFICE NOTE FROM YOBANI GILES 9/23.No further workup needed.

## 2024-05-15 NOTE — PROGRESS NOTES
[facility] PRE-ADMISSION TESTING INSTRUCTIONS    The Preadmission Testing patient is instructed accordingly using the following criteria (check applicable):    ARRIVAL INSTRUCTIONS:  [] Parking the day of Surgery is located in the Main Entrance lot.  Upon entering the door, make an immediate right to the surgery reception desk    [x] Bring photo ID and insurance card    [x] Bring in a copy of Living will or Durable Power of  papers.    [x] Please be sure to arrange for responsible adult to provide transportation to and from the hospital    [x] Please arrange for responsible adult to be with you for the 24 hour period post procedure due to having anesthesia    [x] If you awake am of surgery not feeling well or have temperature >100 please call [phone]    GENERAL INSTRUCTIONS:    [x] May have clear liquids until 2 hours prior to surgery. Examples include water, fruit juices (no pulp), jello, popsicles, black coffee or tea, beef or chicken broth.       May have light meal 6 hours prior to surgery. Example include toast and clear liquids.        No gum, candy or mints.    [x] You may brush your teeth, but do not swallow any water    [x] Take medications as instructed with 1-2 oz of water    [x] Stop herbal supplements and vitamins 5 days prior to procedure    [x] Follow preop dosing of blood thinners per physician instructions    [] Take 1/2 dose of evening insulin, but no insulin after midnight    [] No oral diabetic medications after midnight    [] If diabetic and have low blood sugar or feel symptomatic, take 1-2oz apple juice only    [] Bring inhalers day of surgery    [] Bring C-PAP/ Bi-Pap day of surgery    [] Bring urine specimen day of surgery    [x] Shower or bath with soap, lather and rinse well, AM of Surgery, no lotion, powders or creams to surgical site    [] Follow bowel prep as instructed per surgeon    [x] No tobacco products within 24 hours of surgery     [x] No

## 2024-05-17 ENCOUNTER — ANESTHESIA EVENT (OUTPATIENT)
Dept: OPERATING ROOM | Age: 79
End: 2024-05-17
Payer: MEDICARE

## 2024-05-17 ENCOUNTER — HOSPITAL ENCOUNTER (OUTPATIENT)
Dept: GENERAL RADIOLOGY | Age: 79
Setting detail: OUTPATIENT SURGERY
End: 2024-05-17
Attending: UROLOGY
Payer: MEDICARE

## 2024-05-17 ENCOUNTER — ANESTHESIA (OUTPATIENT)
Dept: OPERATING ROOM | Age: 79
End: 2024-05-17
Payer: MEDICARE

## 2024-05-17 ENCOUNTER — HOSPITAL ENCOUNTER (OUTPATIENT)
Age: 79
Setting detail: OUTPATIENT SURGERY
Discharge: HOME | End: 2024-05-17
Attending: UROLOGY | Admitting: UROLOGY
Payer: MEDICARE

## 2024-05-17 VITALS
DIASTOLIC BLOOD PRESSURE: 61 MMHG | HEART RATE: 71 BPM | RESPIRATION RATE: 185 BRPM | WEIGHT: 220 LBS | SYSTOLIC BLOOD PRESSURE: 124 MMHG | BODY MASS INDEX: 30.8 KG/M2 | TEMPERATURE: 97.2 F | OXYGEN SATURATION: 95 % | HEIGHT: 71 IN

## 2024-05-17 VITALS
OXYGEN SATURATION: 96 % | HEART RATE: 56 BPM | SYSTOLIC BLOOD PRESSURE: 94 MMHG | TEMPERATURE: 97.2 F | DIASTOLIC BLOOD PRESSURE: 53 MMHG | RESPIRATION RATE: 16 BRPM

## 2024-05-17 DIAGNOSIS — R52 PAIN: ICD-10-CM

## 2024-05-17 PROCEDURE — 3600000002 HC SURGERY LEVEL 2 BASE: Performed by: UROLOGY

## 2024-05-17 PROCEDURE — 7100000011 HC PHASE II RECOVERY - ADDTL 15 MIN: Performed by: UROLOGY

## 2024-05-17 PROCEDURE — 6360000002 HC RX W HCPCS: Performed by: UROLOGY

## 2024-05-17 PROCEDURE — 6360000002 HC RX W HCPCS: Performed by: ANESTHESIOLOGY

## 2024-05-17 PROCEDURE — 3600000012 HC SURGERY LEVEL 2 ADDTL 15MIN: Performed by: UROLOGY

## 2024-05-17 PROCEDURE — 6360000002 HC RX W HCPCS

## 2024-05-17 PROCEDURE — 6360000002 HC RX W HCPCS: Performed by: NURSE PRACTITIONER

## 2024-05-17 PROCEDURE — 3700000000 HC ANESTHESIA ATTENDED CARE: Performed by: UROLOGY

## 2024-05-17 PROCEDURE — 7100000010 HC PHASE II RECOVERY - FIRST 15 MIN: Performed by: UROLOGY

## 2024-05-17 PROCEDURE — 2580000003 HC RX 258: Performed by: NURSE PRACTITIONER

## 2024-05-17 PROCEDURE — 3700000001 HC ADD 15 MINUTES (ANESTHESIA): Performed by: UROLOGY

## 2024-05-17 PROCEDURE — 2709999900 HC NON-CHARGEABLE SUPPLY: Performed by: UROLOGY

## 2024-05-17 RX ORDER — SODIUM CHLORIDE 0.9 % (FLUSH) 0.9 %
5-40 SYRINGE (ML) INJECTION PRN
Status: CANCELLED | OUTPATIENT
Start: 2024-05-17

## 2024-05-17 RX ORDER — SODIUM CHLORIDE 9 MG/ML
INJECTION, SOLUTION INTRAVENOUS PRN
Status: DISCONTINUED | OUTPATIENT
Start: 2024-05-17 | End: 2024-05-20 | Stop reason: HOSPADM

## 2024-05-17 RX ORDER — SODIUM CHLORIDE 9 MG/ML
INJECTION, SOLUTION INTRAVENOUS CONTINUOUS
Status: DISCONTINUED | OUTPATIENT
Start: 2024-05-17 | End: 2024-05-17 | Stop reason: HOSPADM

## 2024-05-17 RX ORDER — NALOXONE HYDROCHLORIDE 0.4 MG/ML
INJECTION, SOLUTION INTRAMUSCULAR; INTRAVENOUS; SUBCUTANEOUS PRN
Status: DISCONTINUED | OUTPATIENT
Start: 2024-05-17 | End: 2024-05-20 | Stop reason: HOSPADM

## 2024-05-17 RX ORDER — PROPOFOL 10 MG/ML
INJECTION, EMULSION INTRAVENOUS PRN
Status: DISCONTINUED | OUTPATIENT
Start: 2024-05-17 | End: 2024-05-17 | Stop reason: SDUPTHER

## 2024-05-17 RX ORDER — LABETALOL HYDROCHLORIDE 5 MG/ML
5 INJECTION, SOLUTION INTRAVENOUS
Status: DISCONTINUED | OUTPATIENT
Start: 2024-05-17 | End: 2024-05-20 | Stop reason: HOSPADM

## 2024-05-17 RX ORDER — HYDRALAZINE HYDROCHLORIDE 20 MG/ML
5 INJECTION INTRAMUSCULAR; INTRAVENOUS
Status: CANCELLED | OUTPATIENT
Start: 2024-05-17

## 2024-05-17 RX ORDER — SODIUM CHLORIDE 0.9 % (FLUSH) 0.9 %
5-40 SYRINGE (ML) INJECTION EVERY 12 HOURS SCHEDULED
Status: DISCONTINUED | OUTPATIENT
Start: 2024-05-17 | End: 2024-05-20 | Stop reason: HOSPADM

## 2024-05-17 RX ORDER — SODIUM CHLORIDE 0.9 % (FLUSH) 0.9 %
5-40 SYRINGE (ML) INJECTION EVERY 12 HOURS SCHEDULED
Status: DISCONTINUED | OUTPATIENT
Start: 2024-05-17 | End: 2024-05-17 | Stop reason: HOSPADM

## 2024-05-17 RX ORDER — NALOXONE HYDROCHLORIDE 0.4 MG/ML
INJECTION, SOLUTION INTRAMUSCULAR; INTRAVENOUS; SUBCUTANEOUS PRN
Status: CANCELLED | OUTPATIENT
Start: 2024-05-17

## 2024-05-17 RX ORDER — ONDANSETRON 2 MG/ML
INJECTION INTRAMUSCULAR; INTRAVENOUS PRN
Status: DISCONTINUED | OUTPATIENT
Start: 2024-05-17 | End: 2024-05-17 | Stop reason: SDUPTHER

## 2024-05-17 RX ORDER — GLYCOPYRROLATE 0.2 MG/ML
INJECTION INTRAMUSCULAR; INTRAVENOUS PRN
Status: DISCONTINUED | OUTPATIENT
Start: 2024-05-17 | End: 2024-05-17 | Stop reason: SDUPTHER

## 2024-05-17 RX ORDER — LABETALOL HYDROCHLORIDE 5 MG/ML
5 INJECTION, SOLUTION INTRAVENOUS ONCE
Status: COMPLETED | OUTPATIENT
Start: 2024-05-17 | End: 2024-05-17

## 2024-05-17 RX ORDER — MEPERIDINE HYDROCHLORIDE 25 MG/ML
12.5 INJECTION INTRAMUSCULAR; INTRAVENOUS; SUBCUTANEOUS ONCE
Status: DISCONTINUED | OUTPATIENT
Start: 2024-05-17 | End: 2024-05-20 | Stop reason: HOSPADM

## 2024-05-17 RX ORDER — MEPERIDINE HYDROCHLORIDE 25 MG/ML
12.5 INJECTION INTRAMUSCULAR; INTRAVENOUS; SUBCUTANEOUS ONCE
Status: CANCELLED | OUTPATIENT
Start: 2024-05-17 | End: 2024-05-17

## 2024-05-17 RX ORDER — HYDRALAZINE HYDROCHLORIDE 20 MG/ML
5 INJECTION INTRAMUSCULAR; INTRAVENOUS
Status: DISCONTINUED | OUTPATIENT
Start: 2024-05-17 | End: 2024-05-20 | Stop reason: HOSPADM

## 2024-05-17 RX ORDER — SODIUM CHLORIDE 9 MG/ML
INJECTION, SOLUTION INTRAVENOUS PRN
Status: DISCONTINUED | OUTPATIENT
Start: 2024-05-17 | End: 2024-05-17 | Stop reason: HOSPADM

## 2024-05-17 RX ORDER — PROCHLORPERAZINE EDISYLATE 5 MG/ML
5 INJECTION INTRAMUSCULAR; INTRAVENOUS
Status: CANCELLED | OUTPATIENT
Start: 2024-05-17 | End: 2024-05-18

## 2024-05-17 RX ORDER — FENTANYL CITRATE 50 UG/ML
INJECTION, SOLUTION INTRAMUSCULAR; INTRAVENOUS PRN
Status: DISCONTINUED | OUTPATIENT
Start: 2024-05-17 | End: 2024-05-17 | Stop reason: SDUPTHER

## 2024-05-17 RX ORDER — DIPHENHYDRAMINE HYDROCHLORIDE 50 MG/ML
12.5 INJECTION INTRAMUSCULAR; INTRAVENOUS
Status: CANCELLED | OUTPATIENT
Start: 2024-05-17 | End: 2024-05-18

## 2024-05-17 RX ORDER — FENTANYL CITRATE 50 UG/ML
25 INJECTION, SOLUTION INTRAMUSCULAR; INTRAVENOUS EVERY 5 MIN PRN
Status: CANCELLED | OUTPATIENT
Start: 2024-05-17

## 2024-05-17 RX ORDER — FENTANYL CITRATE 50 UG/ML
25 INJECTION, SOLUTION INTRAMUSCULAR; INTRAVENOUS EVERY 5 MIN PRN
Status: DISCONTINUED | OUTPATIENT
Start: 2024-05-17 | End: 2024-05-20 | Stop reason: HOSPADM

## 2024-05-17 RX ORDER — SODIUM CHLORIDE 0.9 % (FLUSH) 0.9 %
5-40 SYRINGE (ML) INJECTION EVERY 12 HOURS SCHEDULED
Status: CANCELLED | OUTPATIENT
Start: 2024-05-17

## 2024-05-17 RX ORDER — SODIUM CHLORIDE 9 MG/ML
INJECTION, SOLUTION INTRAVENOUS PRN
Status: CANCELLED | OUTPATIENT
Start: 2024-05-17

## 2024-05-17 RX ORDER — LABETALOL HYDROCHLORIDE 5 MG/ML
5 INJECTION, SOLUTION INTRAVENOUS
Status: CANCELLED | OUTPATIENT
Start: 2024-05-17

## 2024-05-17 RX ORDER — DIPHENHYDRAMINE HYDROCHLORIDE 50 MG/ML
12.5 INJECTION INTRAMUSCULAR; INTRAVENOUS
Status: ACTIVE | OUTPATIENT
Start: 2024-05-17 | End: 2024-05-18

## 2024-05-17 RX ORDER — SODIUM CHLORIDE 0.9 % (FLUSH) 0.9 %
5-40 SYRINGE (ML) INJECTION PRN
Status: DISCONTINUED | OUTPATIENT
Start: 2024-05-17 | End: 2024-05-17 | Stop reason: HOSPADM

## 2024-05-17 RX ORDER — PROCHLORPERAZINE EDISYLATE 5 MG/ML
5 INJECTION INTRAMUSCULAR; INTRAVENOUS
Status: ACTIVE | OUTPATIENT
Start: 2024-05-17 | End: 2024-05-18

## 2024-05-17 RX ORDER — GLYCOPYRROLATE 0.2 MG/ML
INJECTION INTRAMUSCULAR; INTRAVENOUS PRN
Status: DISCONTINUED | OUTPATIENT
Start: 2024-05-17 | End: 2024-05-17

## 2024-05-17 RX ORDER — SODIUM CHLORIDE 0.9 % (FLUSH) 0.9 %
5-40 SYRINGE (ML) INJECTION PRN
Status: DISCONTINUED | OUTPATIENT
Start: 2024-05-17 | End: 2024-05-20 | Stop reason: HOSPADM

## 2024-05-17 RX ADMIN — GLYCOPYRROLATE 0.2 MG: 0.2 INJECTION, SOLUTION INTRAMUSCULAR; INTRAVENOUS at 10:16

## 2024-05-17 RX ADMIN — FENTANYL CITRATE 50 MCG: 50 INJECTION, SOLUTION INTRAMUSCULAR; INTRAVENOUS at 10:06

## 2024-05-17 RX ADMIN — ONDANSETRON 4 MG: 2 INJECTION INTRAMUSCULAR; INTRAVENOUS at 10:05

## 2024-05-17 RX ADMIN — WATER 2000 MG: 1 INJECTION INTRAMUSCULAR; INTRAVENOUS; SUBCUTANEOUS at 10:07

## 2024-05-17 RX ADMIN — LABETALOL HYDROCHLORIDE 5 MG: 5 INJECTION INTRAVENOUS at 09:13

## 2024-05-17 RX ADMIN — PROPOFOL 50 MG: 10 INJECTION, EMULSION INTRAVENOUS at 10:05

## 2024-05-17 RX ADMIN — SODIUM CHLORIDE: 9 INJECTION, SOLUTION INTRAVENOUS at 09:59

## 2024-05-17 RX ADMIN — PROPOFOL 100 MCG/KG/MIN: 10 INJECTION, EMULSION INTRAVENOUS at 10:06

## 2024-05-17 ASSESSMENT — PAIN - FUNCTIONAL ASSESSMENT
PAIN_FUNCTIONAL_ASSESSMENT: 0-10
PAIN_FUNCTIONAL_ASSESSMENT: 0-10

## 2024-05-17 ASSESSMENT — LIFESTYLE VARIABLES: SMOKING_STATUS: 0

## 2024-05-17 NOTE — ANESTHESIA POSTPROCEDURE EVALUATION
Department of Anesthesiology  Postprocedure Note    Patient: Reynold Leahy  MRN: [id number]  YOB: 1945  Date of evaluation: 5/17/2024    Procedure Summary       Date: 05/17/24 Room / Location: [address]    Anesthesia Start: 0959 Anesthesia Stop: 1028    Procedure: CYSTOSCOPY RETROGRADE PYELOGRAM BOTOX INJECTION 100 UNITS    (PHARMACY NOTIFIED) Diagnosis:       Enlarged prostate with urinary obstruction      Urge urinary incontinence      (Enlarged prostate with urinary obstruction [N40.1, N13.8])      (Urge urinary incontinence [N39.41])    Surgeons: Juan Cardenas MD Responsible Provider: Michael Whalen DO    Anesthesia Type: MAC ASA Status: 3            Anesthesia Type: No value filed.    Marcelino Phase I: Marcelino Score: 10    Marcelino Phase II:      Anesthesia Post Evaluation    Patient location during evaluation: bedside  Patient participation: complete - patient participated  Level of consciousness: awake and alert  Pain score: 2  Airway patency: patent  Nausea & Vomiting: no nausea and no vomiting  Cardiovascular status: blood pressure returned to baseline  Respiratory status: acceptable  Hydration status: euvolemic  Comments: Seen and examined.  Progressing as expected.  No anesthetic related questions or concerns at this time.  Pain management: adequate      No notable events documented.

## 2024-05-17 NOTE — H&P
Reynold Leahy is a 79 y.o. male with urge incontinence.      Past Medical History:   Diagnosis Date    Anxiety and depression     CAD (coronary artery disease)     PTCA and stents    Depression     Erectile dysfunction     Essential hypertension 05/23/2016    Gastric ulcer     multiple    GERD (gastroesophageal reflux disease)     H/O scarlet fever     Heart murmur     in childhood, scarlet fever    History mild aortic regurgitation     Hyperlipidemia     Hypertension     Hypogonadism male     OAB (overactive bladder)     Psychiatric disorder     Renal disease     S/P sclerotherapy of varicose veins     both legs    Sleep apnea     in past    Thyroid disease     hypothyroidism    Urethral stricture        Past Surgical History:   Procedure Laterality Date    APPENDECTOMY      CARDIAC CATHETERIZATION  7/22/2013    [facility]    CARDIAC SURGERY      stents    CATARACT REMOVAL WITH IMPLANT Bilateral     left 12/16 & right 1/17    CHOLECYSTECTOMY      CIRCUMCISION      CORONARY ANGIOPLASTY  01/24/97    Stent LAD    CORONARY ANGIOPLASTY  03/14/11    instent restenosis of previously placed stents    CORONARY ARTERY BYPASS GRAFT  8/6/13    CYSTOSCOPY N/A 6/16/2023    CYSTOURETHROSCOPY WITH INSERTION OF PERMANENT ADJUSTABLE TRANSPROSTATIC IMPLANT performed by Jaun Cardenas MD at [facility] OR    CYSTOURETHROSCOPY/URETHRAL DILATION      DIAGNOSTIC CARDIAC CATH LAB PROCEDURE  1987    [facility]    DIAGNOSTIC CARDIAC CATH LAB PROCEDURE  01/23/97    [facility]    DIAGNOSTIC CARDIAC CATH LAB PROCEDURE  11/25/97    [facility]    DIAGNOSTIC CARDIAC CATH LAB PROCEDURE  02/18/10    [facility], stent/proximal LAD    DIAGNOSTIC CARDIAC CATH LAB PROCEDURE  03/14/11    ALEX mid LAD        Family History   Problem Relation Age of Onset    Diabetes Sister     Hypertension Sister     Elevated Lipids Sister     Coronary Art Dis Sister         s/p CABG       Prior to Admission medications    Medication Sig Start Date End Date Taking? Authorizing

## 2024-05-17 NOTE — ANESTHESIA PRE PROCEDURE
1.803 m (5' 11\")                                              BP Readings from Last 3 Encounters:   09/08/23 (!) 112/58   06/18/23 (!) 148/69   06/16/23 (!) 166/78       NPO Status:                                                                                 BMI:   Wt Readings from Last 3 Encounters:   09/08/23 100.7 kg (222 lb)   06/18/23 105.2 kg (232 lb)   06/16/23 102.1 kg (225 lb)     Body mass index is 30.68 kg/m².    CBC:   Lab Results   Component Value Date/Time    WBC 15.2 02/26/2024 06:14 AM    RBC 4.54 02/26/2024 06:14 AM    HGB 14.6 02/26/2024 06:14 AM    HCT 42.3 02/26/2024 06:14 AM    MCV 93.2 02/26/2024 06:14 AM    RDW 11.9 02/26/2024 06:14 AM     02/26/2024 06:14 AM       CMP:   Lab Results   Component Value Date/Time     02/26/2024 06:14 AM    K 3.6 02/26/2024 06:14 AM    K 3.1 06/18/2023 01:20 AM     02/26/2024 06:14 AM    CO2 25 02/26/2024 06:14 AM    BUN 23 02/26/2024 06:14 AM    CREATININE 0.9 02/26/2024 06:14 AM    GFRAA >60 06/25/2022 04:16 PM    LABGLOM >60 02/26/2024 06:14 AM    GLUCOSE 99 02/26/2024 06:14 AM    GLUCOSE 81 05/13/2011 04:52 PM    CALCIUM 9.2 02/26/2024 06:14 AM    BILITOT 1.3 02/26/2024 06:14 AM    ALKPHOS 83 02/26/2024 06:14 AM    AST 44 02/26/2024 06:14 AM    ALT 59 02/26/2024 06:14 AM       POC Tests: No results for input(s): \"POCGLU\", \"POCNA\", \"POCK\", \"POCCL\", \"POCBUN\", \"POCHEMO\", \"POCHCT\" in the last 72 hours.    Coags:   Lab Results   Component Value Date/Time    PROTIME 13.5 06/18/2023 01:20 AM    PROTIME 11.8 03/14/2011 02:25 AM    INR 1.2 06/18/2023 01:20 AM    APTT 32.7 08/06/2013 10:00 AM       HCG (If Applicable): No results found for: \"PREGTESTUR\", \"PREGSERUM\", \"HCG\", \"HCGQUANT\"     ABGs: No results found for: \"PHART\", \"PO2ART\", \"VDR3IMB\", \"JVR9XQF\", \"BEART\", \"C4UGHJNB\"     Type & Screen (If Applicable):  No results found for: \"LABABO\"    Drug/Infectious Status (If Applicable):  No results found for: \"HIV\", \"HEPCAB\"    COVID-19 Screening (If

## 2024-05-17 NOTE — DISCHARGE INSTRUCTIONS
[facility]  [address]  [place] [address]  [phone]  FAX [phone]      Discharge instructions for Dr. Jaun Cardenas     General:   -You will be groggy throughout the day due to the effects of anesthesia.  Have a responsible adult monitor you for the next 24 hours.  -Call if fever or chills  -It is very normal to have burning and pain.  If the pain is severe and out of your control contact Dr. Cardenas.    Diet: You may eat or drink whatever you like today.  You may experience some nausea.  Call if you have vomiting or inability to tolerate food or drink.    Urine: Expect blood in your urine.  This is normal.  This may be very traumatic in appearance but is not concerning unless there are large amount of clots that prevent you from being able to urinate.  If you have any questions or concerns contact Dr. Cardenas's office.    Driving: You may not drive for 24 hours.  You also may not drive if you are taking narcotic pain medications.    Activity: There are no restrictions on your activity.

## 2024-05-17 NOTE — DISCHARGE INSTRUCTIONS
Discharge instructions:  Call Dr. Fernando with any questions or concerns  Call office for follow up appointment

## 2024-06-19 LAB
25(OH)D3 SERPL-MCNC: 30.6 NG/ML (ref 30–100)
ALBUMIN SERPL-MCNC: 3.8 G/DL (ref 3.5–5.2)
ALP SERPL-CCNC: 102 U/L (ref 40–129)
ALT SERPL-CCNC: 34 U/L (ref 0–40)
ANION GAP SERPL CALCULATED.3IONS-SCNC: 10 MMOL/L (ref 7–16)
AST SERPL-CCNC: 25 U/L (ref 0–39)
BASOPHILS # BLD: 0.07 K/UL (ref 0–0.2)
BASOPHILS NFR BLD: 1 % (ref 0–2)
BILIRUB DIRECT SERPL-MCNC: <0.2 MG/DL (ref 0–0.3)
BILIRUB INDIRECT SERPL-MCNC: NORMAL MG/DL (ref 0–1)
BILIRUB SERPL-MCNC: 1.2 MG/DL (ref 0–1.2)
BUN SERPL-MCNC: 16 MG/DL (ref 6–23)
CALCIUM SERPL-MCNC: 9 MG/DL (ref 8.6–10.2)
CHLORIDE SERPL-SCNC: 103 MMOL/L (ref 98–107)
CHOLEST SERPL-MCNC: 121 MG/DL
CO2 SERPL-SCNC: 28 MMOL/L (ref 22–29)
CREAT SERPL-MCNC: 0.7 MG/DL (ref 0.7–1.2)
EOSINOPHIL # BLD: 0.39 K/UL (ref 0.05–0.5)
EOSINOPHILS RELATIVE PERCENT: 3 % (ref 0–6)
ERYTHROCYTE [DISTWIDTH] IN BLOOD BY AUTOMATED COUNT: 12.4 % (ref 11.5–15)
GFR, ESTIMATED: >90 ML/MIN/1.73M2
GLUCOSE SERPL-MCNC: 95 MG/DL (ref 74–99)
HCT VFR BLD AUTO: 42.8 % (ref 37–54)
HDLC SERPL-MCNC: 38 MG/DL
HGB BLD-MCNC: 14.8 G/DL (ref 12.5–16.5)
IMM GRANULOCYTES # BLD AUTO: 0.12 K/UL (ref 0–0.58)
IMM GRANULOCYTES NFR BLD: 1 % (ref 0–5)
LDLC SERPL CALC-MCNC: 65 MG/DL
LYMPHOCYTES NFR BLD: 2.07 K/UL (ref 1.5–4)
LYMPHOCYTES RELATIVE PERCENT: 15 % (ref 20–42)
MCH RBC QN AUTO: 31.6 PG (ref 26–35)
MCHC RBC AUTO-ENTMCNC: 34.6 G/DL (ref 32–34.5)
MCV RBC AUTO: 91.3 FL (ref 80–99.9)
MONOCYTES NFR BLD: 0.92 K/UL (ref 0.1–0.95)
MONOCYTES NFR BLD: 7 % (ref 2–12)
NEUTROPHILS NFR BLD: 74 % (ref 43–80)
NEUTS SEG NFR BLD: 10.41 K/UL (ref 1.8–7.3)
PLATELET # BLD AUTO: 275 K/UL (ref 130–450)
PMV BLD AUTO: 10.6 FL (ref 7–12)
POTASSIUM SERPL-SCNC: 3.1 MMOL/L (ref 3.5–5)
PROT SERPL-MCNC: 6.5 G/DL (ref 6.4–8.3)
RBC # BLD AUTO: 4.69 M/UL (ref 3.8–5.8)
SODIUM SERPL-SCNC: 141 MMOL/L (ref 132–146)
TRIGL SERPL-MCNC: 91 MG/DL
TSH SERPL DL<=0.05 MIU/L-ACNC: 1.94 UIU/ML (ref 0.27–4.2)
VLDLC SERPL CALC-MCNC: 18 MG/DL
WBC OTHER # BLD: 14 K/UL (ref 4.5–11.5)

## 2024-06-25 LAB
ALBUMIN SERPL-MCNC: 4 G/DL (ref 3.5–5.2)
ALP SERPL-CCNC: 112 U/L (ref 40–129)
ALT SERPL-CCNC: 30 U/L (ref 0–40)
ANION GAP SERPL CALCULATED.3IONS-SCNC: 15 MMOL/L (ref 7–16)
AST SERPL-CCNC: 23 U/L (ref 0–39)
BILIRUB SERPL-MCNC: 1.2 MG/DL (ref 0–1.2)
BUN SERPL-MCNC: 17 MG/DL (ref 6–23)
CALCIUM SERPL-MCNC: 9.3 MG/DL (ref 8.6–10.2)
CHLORIDE SERPL-SCNC: 102 MMOL/L (ref 98–107)
CO2 SERPL-SCNC: 24 MMOL/L (ref 22–29)
CREAT SERPL-MCNC: 0.8 MG/DL (ref 0.7–1.2)
GFR, ESTIMATED: 88 ML/MIN/1.73M2
GLUCOSE SERPL-MCNC: 150 MG/DL (ref 74–99)
POTASSIUM SERPL-SCNC: 3.2 MMOL/L (ref 3.5–5)
PROT SERPL-MCNC: 7 G/DL (ref 6.4–8.3)
SODIUM SERPL-SCNC: 141 MMOL/L (ref 132–146)

## 2024-06-26 LAB
ANION GAP SERPL CALCULATED.3IONS-SCNC: 15 MMOL/L (ref 7–16)
BASOPHILS # BLD: 0.09 K/UL (ref 0–0.2)
BASOPHILS NFR BLD: 1 % (ref 0–2)
BUN SERPL-MCNC: 17 MG/DL (ref 6–23)
CALCIUM SERPL-MCNC: 9.4 MG/DL (ref 8.6–10.2)
CHLORIDE SERPL-SCNC: 105 MMOL/L (ref 98–107)
CO2 SERPL-SCNC: 23 MMOL/L (ref 22–29)
CREAT SERPL-MCNC: 0.8 MG/DL (ref 0.7–1.2)
EOSINOPHIL # BLD: 0.35 K/UL (ref 0.05–0.5)
EOSINOPHILS RELATIVE PERCENT: 3 % (ref 0–6)
ERYTHROCYTE [DISTWIDTH] IN BLOOD BY AUTOMATED COUNT: 12.5 % (ref 11.5–15)
GFR, ESTIMATED: >90 ML/MIN/1.73M2
GLUCOSE SERPL-MCNC: 95 MG/DL (ref 74–99)
HCT VFR BLD AUTO: 41.5 % (ref 37–54)
HGB BLD-MCNC: 14.3 G/DL (ref 12.5–16.5)
IMM GRANULOCYTES # BLD AUTO: 0.11 K/UL (ref 0–0.58)
IMM GRANULOCYTES NFR BLD: 1 % (ref 0–5)
LYMPHOCYTES NFR BLD: 1.79 K/UL (ref 1.5–4)
LYMPHOCYTES RELATIVE PERCENT: 15 % (ref 20–42)
MCH RBC QN AUTO: 31.6 PG (ref 26–35)
MCHC RBC AUTO-ENTMCNC: 34.5 G/DL (ref 32–34.5)
MCV RBC AUTO: 91.8 FL (ref 80–99.9)
MONOCYTES NFR BLD: 0.99 K/UL (ref 0.1–0.95)
MONOCYTES NFR BLD: 8 % (ref 2–12)
NEUTROPHILS NFR BLD: 73 % (ref 43–80)
NEUTS SEG NFR BLD: 8.91 K/UL (ref 1.8–7.3)
PLATELET # BLD AUTO: 291 K/UL (ref 130–450)
PMV BLD AUTO: 10.7 FL (ref 7–12)
POTASSIUM SERPL-SCNC: 4.1 MMOL/L (ref 3.5–5)
RBC # BLD AUTO: 4.52 M/UL (ref 3.8–5.8)
SODIUM SERPL-SCNC: 143 MMOL/L (ref 132–146)
WBC OTHER # BLD: 12.2 K/UL (ref 4.5–11.5)

## 2024-07-03 LAB
ANION GAP SERPL CALCULATED.3IONS-SCNC: 14 MMOL/L (ref 7–16)
BASOPHILS # BLD: 0.07 K/UL (ref 0–0.2)
BASOPHILS NFR BLD: 1 % (ref 0–2)
BUN SERPL-MCNC: 19 MG/DL (ref 6–23)
CALCIUM SERPL-MCNC: 9.1 MG/DL (ref 8.6–10.2)
CHLORIDE SERPL-SCNC: 106 MMOL/L (ref 98–107)
CO2 SERPL-SCNC: 25 MMOL/L (ref 22–29)
CREAT SERPL-MCNC: 0.7 MG/DL (ref 0.7–1.2)
EOSINOPHIL # BLD: 0.36 K/UL (ref 0.05–0.5)
EOSINOPHILS RELATIVE PERCENT: 4 % (ref 0–6)
ERYTHROCYTE [DISTWIDTH] IN BLOOD BY AUTOMATED COUNT: 12.7 % (ref 11.5–15)
GFR, ESTIMATED: >90 ML/MIN/1.73M2
GLUCOSE SERPL-MCNC: 105 MG/DL (ref 74–99)
HCT VFR BLD AUTO: 41.1 % (ref 37–54)
HGB BLD-MCNC: 13.9 G/DL (ref 12.5–16.5)
IMM GRANULOCYTES # BLD AUTO: 0.09 K/UL (ref 0–0.58)
IMM GRANULOCYTES NFR BLD: 1 % (ref 0–5)
LYMPHOCYTES NFR BLD: 1.9 K/UL (ref 1.5–4)
LYMPHOCYTES RELATIVE PERCENT: 18 % (ref 20–42)
MCH RBC QN AUTO: 30.8 PG (ref 26–35)
MCHC RBC AUTO-ENTMCNC: 33.8 G/DL (ref 32–34.5)
MCV RBC AUTO: 91.1 FL (ref 80–99.9)
MONOCYTES NFR BLD: 0.76 K/UL (ref 0.1–0.95)
MONOCYTES NFR BLD: 7 % (ref 2–12)
NEUTROPHILS NFR BLD: 69 % (ref 43–80)
NEUTS SEG NFR BLD: 7.24 K/UL (ref 1.8–7.3)
PLATELET # BLD AUTO: 245 K/UL (ref 130–450)
PMV BLD AUTO: 10.8 FL (ref 7–12)
POTASSIUM SERPL-SCNC: 3.7 MMOL/L (ref 3.5–5)
RBC # BLD AUTO: 4.51 M/UL (ref 3.8–5.8)
SODIUM SERPL-SCNC: 145 MMOL/L (ref 132–146)
WBC OTHER # BLD: 10.4 K/UL (ref 4.5–11.5)

## 2024-07-30 LAB
BACTERIA URNS QL MICRO: ABNORMAL
BILIRUB UR QL STRIP: NEGATIVE
CLARITY UR: ABNORMAL
COLOR UR: YELLOW
GLUCOSE UR STRIP-MCNC: NEGATIVE MG/DL
HGB UR QL STRIP.AUTO: ABNORMAL
KETONES UR STRIP-MCNC: NEGATIVE MG/DL
LEUKOCYTE ESTERASE UR QL STRIP: ABNORMAL
NITRITE UR QL STRIP: POSITIVE
PH UR STRIP: 6 [PH] (ref 5–9)
PROT UR STRIP-MCNC: 30 MG/DL
RBC #/AREA URNS HPF: ABNORMAL /HPF
SP GR UR STRIP: >1.03 (ref 1–1.03)
UROBILINOGEN UR STRIP-ACNC: 1 EU/DL (ref 0–1)
WBC #/AREA URNS HPF: ABNORMAL /HPF

## 2024-08-01 LAB
MICROORGANISM SPEC CULT: ABNORMAL
SPECIMEN DESCRIPTION: ABNORMAL

## 2024-10-28 NOTE — OP NOTE
Operative Note      Patient: Reynold Leahy  YOB: 1945  MRN: [id number]    Date of Procedure: 5/17/2024    Pre-Op Diagnosis Codes:     * Enlarged prostate with urinary obstruction [N40.1, N13.8]     * Urge urinary incontinence [N39.41]    Post-Op Diagnosis: Same       Procedure: Cystoscopy, Botox 100 units    Surgeon(s):  Jaun Cardenas MD    Assistant:   * No surgical staff found *    Anesthesia: Monitor Anesthesia Care    Estimated Blood Loss (mL): Minimal    Complications: None    Specimens:   * No specimens in log *    Implants:  * No implants in log *      Drains: * No LDAs found *      INDICATION FOR PROCEDURE: Reynold Leahy is a 79 y.o. patient who presents for cystoscopy, Botox injection.  He understands the risks, benefits, and alternatives of the procedure, including bleeding, infection, retention of urine.  The patient signed an informed consent, and agreed to proceed.     PROCEDURE:   The patient was brought into the operating room and placed under anesthesia in the dorsal lithotomy position. The patient was prepped and draped in a sterile fashion. A 21-Luxembourgish cystoscope with a 30-degree lens was passed through the urethra and into the bladder.  The entire length of the urethra was examined and found to be without strictures or other abnormalities. The entire bladder mucosal surface was examined and found to be without calculi, tumors, diverticula.  There was moderate trabeculation noted.  The ureteral orifices were normal in size, number, and location. The entire cystoscopic exam was within normal limits.     Botox was then injected into the detrusor muscle in a series of 0.5 cc injections for a total of 100 units.  There was no excessive bleeding.  The bladder was drained. The patient was awakened from anesthesia and taken to recovery in stable condition.      Jaun Cardenas MD  5/17/2024   10:31 AM      Electronically signed by Jaun Cardenas MD on 5/17/2024 at  Attending with

## (undated) DEVICE — GAUZE,SPONGE,4"X4",8PLY,STRL,LF,10/TRAY: Brand: MEDLINE

## (undated) DEVICE — TUBING, SUCTION, 3/16" X 12', STRAIGHT: Brand: MEDLINE

## (undated) DEVICE — JELLY LUBRICATING 4OZ FLIP TOP TB E Z

## (undated) DEVICE — SYRINGE, LUER LOCK, 10ML: Brand: MEDLINE

## (undated) DEVICE — SOLUTION IRRIG 3000ML STRL H2O USP UROMATIC PLAS CONT

## (undated) DEVICE — GLOVE ORANGE PI 7   MSG9070

## (undated) DEVICE — MEDICINE CUP, GRADUATED, STER: Brand: MEDLINE

## (undated) DEVICE — SOLUTION IRRIG 1000ML STRL H2O USP PLAS POUR BTL

## (undated) DEVICE — 4-PORT MANIFOLD: Brand: NEPTUNE 2

## (undated) DEVICE — GLOVE ORANGE PI 7 1/2   MSG9075

## (undated) DEVICE — JELLY LIDOCAINE 2% UROJET PFS 25X5ML

## (undated) DEVICE — BAG DRNGE COMB PK

## (undated) DEVICE — SOLUTION SCRB 4OZ 4% CHG H2O AIDED FOR PREOPERATIVE SKIN

## (undated) DEVICE — HOSE CONN FOR WST MGMT SYS NEPTUNE 2

## (undated) DEVICE — GOWN,SIRUS,FABRNF,XL,20/CS: Brand: MEDLINE

## (undated) DEVICE — CYSTO PACK: Brand: MEDLINE INDUSTRIES, INC.